# Patient Record
Sex: FEMALE | Race: WHITE | NOT HISPANIC OR LATINO | Employment: OTHER | URBAN - METROPOLITAN AREA
[De-identification: names, ages, dates, MRNs, and addresses within clinical notes are randomized per-mention and may not be internally consistent; named-entity substitution may affect disease eponyms.]

---

## 2017-01-09 ENCOUNTER — HOSPITAL ENCOUNTER (OUTPATIENT)
Dept: RADIOLOGY | Facility: MEDICAL CENTER | Age: 57
End: 2017-01-09
Attending: OBSTETRICS & GYNECOLOGY
Payer: COMMERCIAL

## 2017-01-09 DIAGNOSIS — Z13.9 SCREENING: ICD-10-CM

## 2017-01-09 PROCEDURE — G0202 SCR MAMMO BI INCL CAD: HCPCS

## 2017-01-24 ENCOUNTER — OFFICE VISIT (OUTPATIENT)
Dept: ENDOCRINOLOGY | Facility: MEDICAL CENTER | Age: 57
End: 2017-01-24
Payer: COMMERCIAL

## 2017-01-24 VITALS
SYSTOLIC BLOOD PRESSURE: 130 MMHG | DIASTOLIC BLOOD PRESSURE: 80 MMHG | WEIGHT: 132.4 LBS | BODY MASS INDEX: 22.61 KG/M2 | OXYGEN SATURATION: 97 % | HEIGHT: 64 IN | HEART RATE: 88 BPM

## 2017-01-24 DIAGNOSIS — E04.2 MULTIPLE THYROID NODULES: ICD-10-CM

## 2017-01-24 PROCEDURE — 99213 OFFICE O/P EST LOW 20 MIN: CPT | Performed by: INTERNAL MEDICINE

## 2017-01-24 NOTE — MR AVS SNAPSHOT
"        Sandra Lewis   2017 1:00 PM   Office Visit   MRN: [id number]    Department:  Endocrinology Med [facility]   Dept Phone:  [phone]    Description:  Female : 1960   Provider:  Esdras Almaguer M.D.           Reason for Visit     Follow-Up Thyroid Nodule      Allergies as of 2017     No Known Allergies      You were diagnosed with     Multiple thyroid nodules   [[id number]]         Vital Signs     Blood Pressure Pulse Height Weight Body Mass Index Oxygen Saturation    130/80 mmHg 88 1.626 m (5' 4\") 60.056 kg (132 lb 6.4 oz) 22.72 kg/m2 97%    Last Menstrual Period Smoking Status                2010 Never Smoker           Basic Information     Date Of Birth Sex Race Ethnicity Preferred Language    1960 Female White Non- English      Your appointments     Mar 07, 2017  8:00 AM   [facility] with [address] [facility] ([address])    [address] [facility] [address]  [facility] [id number] [phone]           Check in 30 minutes prior.              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      Below and/or attached are the medications your provider expects you to take. Review all of your home medications and newly ordered medications with your provider and/or pharmacist. Follow medication instructions as directed by your provider and/or pharmacist. Please keep your medication list with you and share with your provider. Update the information when medications are discontinued, doses are changed, or new medications (including over-the-counter products) are added; and carry medication information at all times in the event of emergency situations     Allergies:  No Known Allergies          Medications  Valid as of: January 24, 2017 -  1:24 PM    Generic Name Brand Name Tablet Size Instructions for use    Albuterol Sulfate (Aero Soln) albuterol 108 (90 BASE) MCG/ACT Inhale 2 Puffs by mouth every 6 hours as needed.        Albuterol Sulfate (Aero Soln) albuterol 108 (90 BASE) MCG/ACT Inhale 2 Puffs by mouth every 6 hours as needed for Shortness of Breath.        Aspirin (Tab)  MG Take 2 Tabs by mouth every day.        Atorvastatin Calcium (Tab) LIPITOR 20 MG Take 20 mg by mouth every evening.        B Complex Vitamins   Take  by mouth.        Baclofen (Tab) LIORESAL 10 MG Take 1 Tab by mouth every bedtime.        Cholecalciferol (Cap) vitamin D3 5000 UNITS Take 1 Cap by mouth every day.        Coenzyme Q10 (Cap) coenzyme Q-10 30 MG Take 60 mg by mouth every day.        Cyanocobalamin (Tab) VITAMIN B-12 100 MCG Take 100 mcg by mouth every day.        Estrogens Conjugated   Cream  prn        Fexofenadine HCl (Tab) ALLEGRA 60 MG Take 60 mg by mouth every day.        Fluticasone Propionate (Suspension) FLONASE 50 MCG/ACT Spray 1 Spray in nose 2 times a day.        Krill Oil   Take  by mouth every day.        Lansoprazole (CAPSULE DELAYED RELEASE) PREVACID 30 MG Take 1 Cap by mouth every day.        Omega-3 Fatty Acids (Cap) OMEGA 3 FA 1000 MG  Take 1,000 mg by mouth 3 times a day, with meals.        Turmeric (Cap) Turmeric Curcumin 500 MG Take  by mouth every day.        .                 Medicines prescribed today were sent to:     [facility] [place] [facility] [address] [facility]    [address] [facility] [address]    Phone: [phone] Fax: [phone]    Open 24 Hours?: No      Medication refill instructions:       If your prescription bottle indicates you have medication refills left, it is not necessary to call your provider’s office. Please contact your pharmacy and they will refill your medication.    If your prescription bottle indicates you do not have any refills left, you may request refills at any time through one of the following ways: The online [employer] system (except Urgent Care), by calling your provider’s office, or by asking your pharmacy to contact your provider’s office with a refill request. Medication refills are processed only during regular business hours and may not be available until the next business day. Your provider may request additional information or to have a follow-up visit with you prior to refilling your medication.   *Please Note: Medication refills are assigned a new Rx number when refilled electronically. Your pharmacy may indicate that no refills were authorized even though a new prescription for the same medication is available at the pharmacy. Please request the medicine by name with the pharmacy before contacting your provider for a refill.        Your To Do List     Future Labs/Procedures Complete By Expires    FREE THYROXINE  As directed 1/24/2018    TSH  As directed 1/24/2018    US-SOFT TISSUES OF HEAD - NECK  As directed 1/24/2018         [employer] Access Code: Activation code not generated  Current [employer] Status: Active

## 2017-01-24 NOTE — PROGRESS NOTES
Endocrinology Clinic Progress Note  PCP: Romeo Marquez M.D.    CC: Thyroid nodules    HPI:  Sandra Lewis is a 56 y.o. old patient who comes in today for routine follow up. Patient is new to me today, previously saw Dr. Haynes. Several years ago she was incidentally found to have thyroid nodules on carotid ultrasound. Thyroid ultrasound in 2014 showed bilateral thyroid nodules. 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 She underwent left hemithyroidectomy in April 2015, pathology was benign. No family history of thyroid cancer. No difficulties with swallowing or breathing. She is not on any thyroid medications. Energy levels are good.    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   • H/O hysterectomy for benign disease 10/16/2014   • Hormone replacement therapy, postmenopausal 10/16/2014   • Seasonal allergies 10/16/2014   • Vitamin D insufficiency 10/16/2014   • Vitamin B deficiency 10/16/2014   • Thyroid nodule 10/16/2014   • Hyperlipidemia 06/06/2012       Medications:    Current outpatient prescriptions:   •  aspirin (ASA) 325 MG TABS, Take 2 Tabs by mouth every day., Disp: 100 Tab, Rfl: 3  •  Turmeric Curcumin 500 MG CAPS, Take  by mouth every day., Disp: , Rfl:   •  B Complex Vitamins (B COMPLEX PO), Take  by mouth., Disp: , Rfl:   •  fexofenadine (ALLEGRA) 60 MG TABS, Take 60 mg by mouth every day., Disp: , Rfl:   •  Cholecalciferol (VITAMIN D3) 5000 UNITS CAPS, Take 1 Cap by mouth every day., Disp: , Rfl:   •  cyanocobalamin (VITAMIN B-12) 100 MCG TABS, Take 100 mcg by mouth every day., Disp: , Rfl:   •  coenzyme Q-10 30 MG capsule, Take 60 mg by mouth every day., Disp: , Rfl:   •   docosahexanoic acid (OMEGA 3 FA) 1000 MG CAPS, Take 1,000 mg by mouth 3 times a day, with meals., Disp: , Rfl:   •  lansoprazole (PREVACID) 30 MG CAPSULE DELAYED RELEASE, Take 1 Cap by mouth every day., Disp: 30 Cap, Rfl: 0  •  albuterol 108 (90 BASE) MCG/ACT Aero Soln inhalation aerosol, Inhale 2 Puffs by mouth every 6 hours as needed for Shortness of Breath., Disp: 8.5 g, Rfl: 0  •  fluticasone (FLONASE) 50 MCG/ACT nasal spray, Spray 1 Spray in nose 2 times a day., Disp: 16 g, Rfl: 0  •  baclofen (LIORESAL) 10 MG Tab, Take 1 Tab by mouth every bedtime., Disp: 30 Tab, Rfl: 0  •  albuterol (VENTOLIN OR PROVENTIL) 108 (90 BASE) MCG/ACT AERS inhalation aerosol, Inhale 2 Puffs by mouth every 6 hours as needed., Disp: , Rfl:   •  KRILL OIL PO, Take  by mouth every day., Disp: , Rfl:   •  ESTROGENS CONJUGATED, Cream  prn, Disp: , Rfl:   •  atorvastatin (LIPITOR) 20 MG TABS, Take 20 mg by mouth every evening., Disp: , Rfl:     Labs:    Pathology report from April 2015:  FINAL DIAGNOSIS:  A. Left maddy thyroidectomy:         Left lobe of thyroid demonstrating a benign 0.7 cm follicular          adenoma.  No malignancy identified.    Pathology report from February 2015:  FINAL DIAGNOSIS:  A. Right thyroid fine needle aspiration:         Bloody smears demonstrating scattered small flat groups of          thyroid follicular epithelial cells including a few small          micro-follicles, consistent with a follicular lesion.         No cytologic features of papillary carcinoma are seen.  B. Left thyroid fine needle aspiration:         Bloody smears demonstrating numerous groups of thyroid          follicular epithelial cells showing subtle Hurthle cell change.         There are also scattered groups of follicular epithelial cells          without significant Hurthle cell change and some background          colloid is also noted.         No cytologic features of papillary carcinoma are seen.         See comment.    10/23/2014  "2:45 PM  HISTORY/REASON FOR EXAM:  Thyroid nodule  Mass/Lump.  TECHNIQUE/EXAM DESCRIPTION:  Ultrasound of the soft tissues of the head and neck.  FINDINGS:  The right thyroid lobe measures approximately 3.9 x 0.9 x 1.6 cm in size. The left thyroid lobe measures approximately 4.1 x 0.9 x 1.6 cm in size. The thyroid isthmus measures 1.5 mm.  A hypoechoic nodule in the right medial thyroid lobe measures approximately 11 x 9 x 4 mm. A hypoechoic nodule in the inferior left thyroid lobe measures approximately 10 x 9 x 6 mm. There is internal vascularity and a hypoechoic halo. No microcalcification is identified. There is a smaller cyst in the superior left thyroid lobe.    Physical Examination:  Vital signs: /80 mmHg  Pulse 88  Ht 1.626 m (5' 4\")  Wt 60.056 kg (132 lb 6.4 oz)  BMI 22.72 kg/m2  SpO2 97%  LMP 07/17/2010  General: No apparent distress, cooperative  Eyes: No scleral icterus, no discharge  Neck: No abnormal masses  Resp: Normal effort, clear to auscultation bilaterally  CVS: Regular rate and rhythm, S1 S2 normal, no murmur  Extremities: No lower extremity edema  Psych: Alert and oriented, normal mood and affect    Assessment and Plan:    1. 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    Thank you for allowing me to participate in the care of this patient.    Esdras Almaguer M.D.  01/24/2017    CC:   Romeo Marquez M.D.    This note was created using voice recognition software (Dragon). " The accuracy of the dictation is limited by the abilities of the software. I have reviewed the note prior to signing, however some errors in grammar and context are still possible. If you have any questions related to this note please do not hesitate to contact our office.

## 2017-02-16 ENCOUNTER — APPOINTMENT (OUTPATIENT)
Dept: RADIOLOGY | Facility: MEDICAL CENTER | Age: 57
End: 2017-02-16
Payer: COMMERCIAL

## 2017-03-07 ENCOUNTER — APPOINTMENT (OUTPATIENT)
Dept: RADIOLOGY | Facility: MEDICAL CENTER | Age: 57
End: 2017-03-07
Attending: INTERNAL MEDICINE
Payer: COMMERCIAL

## 2017-04-28 ENCOUNTER — OFFICE VISIT (OUTPATIENT)
Dept: URGENT CARE | Facility: CLINIC | Age: 57
End: 2017-04-28
Payer: COMMERCIAL

## 2017-04-28 VITALS
HEART RATE: 76 BPM | DIASTOLIC BLOOD PRESSURE: 70 MMHG | TEMPERATURE: 98.3 F | OXYGEN SATURATION: 98 % | SYSTOLIC BLOOD PRESSURE: 120 MMHG | RESPIRATION RATE: 16 BRPM

## 2017-04-28 DIAGNOSIS — M25.562 ACUTE PAIN OF LEFT KNEE: ICD-10-CM

## 2017-04-28 DIAGNOSIS — M25.462 KNEE EFFUSION, LEFT: ICD-10-CM

## 2017-04-28 PROCEDURE — 99214 OFFICE O/P EST MOD 30 MIN: CPT | Performed by: NURSE PRACTITIONER

## 2017-04-28 ASSESSMENT — ENCOUNTER SYMPTOMS
JOINT SWELLING: 1
FEVER: 0

## 2017-04-28 NOTE — MR AVS SNAPSHOT
Sandra Tony Lewis   2017 5:00 PM   Office Visit   MRN: [id number]    Department:  [facility]   Dept Phone:  [phone]    Description:  Female : 1960   Provider:  ZHENG Umanzor           Reason for Visit     Knee Injury left knee, need referral to ortho      Allergies as of 2017     No Known Allergies      You were diagnosed with     Acute pain of left knee   [3215410]       Knee effusion, left   [[id number]]         Vital Signs     Blood Pressure Pulse Temperature Respirations Oxygen Saturation Last Menstrual Period    120/70 mmHg 76 36.8 °C (98.3 °F) 16 98% 2010    Smoking Status                   Never Smoker            Basic Information     Date Of Birth Sex Race Ethnicity Preferred Language    1960 Female White Non- English      Problem List              ICD-10-CM Priority Class Noted - Resolved    Hyperlipidemia E78.5   2012 - Present    H/O hysterectomy for benign disease Z90.710   10/16/2014 - Present    Hormone replacement therapy, postmenopausal Z79.890   10/16/2014 - Present    Seasonal allergies J30.2   10/16/2014 - Present    Vitamin D insufficiency E55.9   10/16/2014 - Present    Vitamin B deficiency E53.9   10/16/2014 - Present    Thyroid nodule E04.1   10/16/2014 - Present    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      Below and/or attached are the medications your provider expects you to take. Review all of your home medications and newly ordered medications with your provider and/or  pharmacist. Follow medication instructions as directed by your provider and/or pharmacist. Please keep your medication list with you and share with your provider. Update the information when medications are discontinued, doses are changed, or new medications (including over-the-counter products) are added; and carry medication information at all times in the event of emergency situations     Allergies:  No Known Allergies          Medications  Valid as of: April 28, 2017 -  5:43 PM    Generic Name Brand Name Tablet Size Instructions for use    Albuterol Sulfate (Aero Soln) albuterol 108 (90 BASE) MCG/ACT Inhale 2 Puffs by mouth every 6 hours as needed.        Albuterol Sulfate (Aero Soln) albuterol 108 (90 BASE) MCG/ACT Inhale 2 Puffs by mouth every 6 hours as needed for Shortness of Breath.        Aspirin (Tab)  MG Take 2 Tabs by mouth every day.        Atorvastatin Calcium (Tab) LIPITOR 20 MG Take 20 mg by mouth every evening.        B Complex Vitamins   Take  by mouth.        Baclofen (Tab) LIORESAL 10 MG Take 1 Tab by mouth every bedtime.        Cholecalciferol (Cap) vitamin D3 5000 UNITS Take 1 Cap by mouth every day.        Coenzyme Q10 (Cap) coenzyme Q-10 30 MG Take 60 mg by mouth every day.        Cyanocobalamin (Tab) VITAMIN B-12 100 MCG Take 100 mcg by mouth every day.        Estrogens Conjugated   Cream  prn        Fexofenadine HCl (Tab) ALLEGRA 60 MG Take 60 mg by mouth every day.        Fluticasone Propionate (Suspension) FLONASE 50 MCG/ACT Spray 1 Spray in nose 2 times a day.        Krill Oil   Take  by mouth every day.        Lansoprazole (CAPSULE DELAYED RELEASE) PREVACID 30 MG Take 1 Cap by mouth every day.        Omega-3 Fatty Acids (Cap) OMEGA 3 FA 1000 MG Take 1,000 mg by mouth 3 times a day, with meals.        Turmeric (Cap) Turmeric Curcumin 500 MG Take  by mouth every day.        .                 Medicines prescribed today were sent to:     [id number] - [place] - [address] [facility]     [address] [facility] [address]    Phone: [phone] Fax: [phone]    Open 24 Hours?: No      Medication refill instructions:       If your prescription bottle indicates you have medication refills left, it is not necessary to call your provider’s office. Please contact your pharmacy and they will refill your medication.    If your prescription bottle indicates you do not have any refills left, you may request refills at any time through one of the following ways: The online [employer] system (except Urgent Care), by calling your provider’s office, or by asking your pharmacy to contact your provider’s office with a refill request. Medication refills are processed only during regular business hours and may not be available until the next business day. Your provider may request additional information or to have a follow-up visit with you prior to refilling your medication.   *Please Note: Medication refills are assigned a new Rx number when refilled electronically. Your pharmacy may indicate that no refills were authorized even though a new prescription for the same medication is available at the pharmacy. Please request the medicine by name with the pharmacy before contacting your provider for a refill.        Your To Do List     Future Labs/Procedures Complete By Expires    MR-KNEE-W/O LEFT  As directed 4/28/2018      Referral     A referral request has been sent to our patient care coordination department. Please allow 3-5 business days for us to process this request and contact you either by phone or mail. If you do not hear from us by the 5th business day, please call us at [phone].           [employer] Access Code: Activation code not generated  Current [employer] Status: Active

## 2017-04-29 NOTE — PROGRESS NOTES
Subjective:      Sandra Lewis is a 56 y.o. female who presents with Knee Injury            Knee Injury  This is a new problem. Episode onset: over the last month. The problem occurs constantly. The problem has been unchanged. Associated symptoms include joint swelling. Pertinent negatives include no fever. Associated symptoms comments: She recently has been playing more golf and her knee became significantly more swollen this last week. She also consistently experiences clicking, popping and catching of her left knee . The symptoms are aggravated by standing and twisting. She has tried ice for the symptoms. The treatment provided no relief.       Review of Systems   Constitutional: Negative for fever.   Musculoskeletal: Positive for joint pain (left knee) and joint swelling.   All other systems reviewed and are negative.    Past Medical History   Diagnosis Date   • ASTHMA      no meds   • Other specified symptom associated with female genital organs      lots of cramping, cyst   • Seasonal allergies 10/16/2014   • Vitamin D insufficiency 10/16/2014   • Thyroid disease    • Anesthesia      nausea      Past Surgical History   Procedure Laterality Date   • Septoplasty  2006     Dr. Jackson   • Cystoscopy  8/6/2010     Performed by FABIOLA VAZ at SURGERY SAME DAY [facility]   • Other orthopedic surgery  1987     rt knee arthroscopic   • Pterygium excision  11/17/2010     Performed by STARLA FOWLER at SURGERY SAME DAY [facility]   • Nasal polypectomy  2006     Dr. Craig   • Vaginal hysterectomy scope total  8/6/2010     Performed by FABIOLA VAZ at SURGERY SAME DAY [facility]   • Thyroidectomy  4/16/2015     Performed by John Valente M.D. at SURGERY [facility] DAY [facility]      Social History     Social History   • Marital Status:      Spouse Name: N/A   • Number of Children: N/A   • Years of Education: N/A     Occupational History   • Not on file.     Social History Main Topics   •  Smoking status: Never Smoker    • Smokeless tobacco: Never Used   • Alcohol Use: 3.0 oz/week     3 Glasses of wine, 3 Shots of liquor per week      Comment: 4 per week   • Drug Use: No   • Sexual Activity:     Partners: Male     Other Topics Concern   • Not on file     Social History Narrative          Objective:     /70 mmHg  Pulse 76  Temp(Src) 36.8 °C (98.3 °F)  Resp 16  SpO2 98%  LMP 07/17/2010     Physical Exam   Constitutional: She is oriented to person, place, and time. Vital signs are normal. She appears well-developed and well-nourished.   HENT:   Head: Normocephalic and atraumatic.   Eyes: EOM are normal. Pupils are equal, round, and reactive to light.   Neck: Normal range of motion.   Cardiovascular: Normal rate and regular rhythm.    Pulmonary/Chest: Effort normal.   Musculoskeletal:        Left knee: She exhibits decreased range of motion, swelling, effusion and abnormal meniscus. She exhibits no deformity and normal alignment. No tenderness found.   Mild crepitus appreciated with exam  Moderate STS  No ecchymosis  Distal CMS intact  Antalgic gait   Neurological: She is alert and oriented to person, place, and time. She has normal strength. No cranial nerve deficit or sensory deficit.   Skin: Skin is warm and dry.   Psychiatric: She has a normal mood and affect. Her speech is normal and behavior is normal. Thought content normal.   Vitals reviewed.              Assessment/Plan:     1. Acute pain of left knee  2. Knee effusion, left  - MR-KNEE-W/O LEFT; Future  - REFERRAL TO ORTHOPEDICS    Suspect meniscal tear  RICE  Ibuprofen and Tylenol PRN pain    Supportive care, differential diagnoses, and indications for immediate follow-up discussed with patient.    Pathogenesis of diagnosis discussed including typical length and natural progression.      Instructed to return to  or nearest emergency department if symptoms fail to improve, for any change in condition, further concerns, or new  concerning symptoms.  Patient states understanding of the plan of care and discharge instructions.

## 2017-05-04 ENCOUNTER — APPOINTMENT (OUTPATIENT)
Dept: RADIOLOGY | Facility: MEDICAL CENTER | Age: 57
End: 2017-05-04
Attending: NURSE PRACTITIONER
Payer: COMMERCIAL

## 2017-05-04 DIAGNOSIS — M25.462 KNEE EFFUSION, LEFT: ICD-10-CM

## 2017-05-04 DIAGNOSIS — M25.562 ACUTE PAIN OF LEFT KNEE: ICD-10-CM

## 2017-05-04 PROCEDURE — 73721 MRI JNT OF LWR EXTRE W/O DYE: CPT | Mod: LT

## 2018-05-14 ENCOUNTER — TELEPHONE (OUTPATIENT)
Dept: MEDICAL GROUP | Facility: MEDICAL CENTER | Age: 58
End: 2018-05-14

## 2018-05-14 DIAGNOSIS — Z00.00 ANNUAL PHYSICAL EXAM: ICD-10-CM

## 2018-05-14 NOTE — TELEPHONE ENCOUNTER
1. Caller Name: Pt                                          Call Back Number: [phone] (home)         Patient approves a detailed voicemail message: N\A    2. SPECIFIC Action To Be Taken: Lab orders requested    3. Diagnosis/Clinical Reason for Request: Pt is requesting labs to be ordered before her appt on 5/31/18    4. Specialty & Provider Name/Lab/Imaging Location: None specified    5. Is appointment scheduled for requested order/referral: no    Patient informed they will receive a return phone call from the office ONLY if there are any questions before processing their request. Advised to call back if they haven't received a call from the referral department in 5 days.

## 2018-05-31 ENCOUNTER — OFFICE VISIT (OUTPATIENT)
Dept: MEDICAL GROUP | Facility: MEDICAL CENTER | Age: 58
End: 2018-05-31

## 2018-05-31 VITALS
SYSTOLIC BLOOD PRESSURE: 130 MMHG | BODY MASS INDEX: 22.2 KG/M2 | DIASTOLIC BLOOD PRESSURE: 78 MMHG | RESPIRATION RATE: 14 BRPM | OXYGEN SATURATION: 99 % | HEIGHT: 64 IN | TEMPERATURE: 97.3 F | WEIGHT: 130 LBS | HEART RATE: 76 BPM

## 2018-05-31 DIAGNOSIS — Z00.00 ANNUAL PHYSICAL EXAM: ICD-10-CM

## 2018-05-31 DIAGNOSIS — M25.561 CHRONIC PAIN OF RIGHT KNEE: ICD-10-CM

## 2018-05-31 DIAGNOSIS — G89.29 CHRONIC PAIN OF RIGHT KNEE: ICD-10-CM

## 2018-05-31 DIAGNOSIS — N95.2 POSTMENOPAUSAL ATROPHIC VAGINITIS: ICD-10-CM

## 2018-05-31 DIAGNOSIS — Z12.83 SKIN CANCER SCREENING: ICD-10-CM

## 2018-05-31 DIAGNOSIS — E78.00 PURE HYPERCHOLESTEROLEMIA: ICD-10-CM

## 2018-05-31 DIAGNOSIS — Z12.31 ENCOUNTER FOR SCREENING MAMMOGRAM FOR BREAST CANCER: ICD-10-CM

## 2018-05-31 DIAGNOSIS — Z78.0 ASYMPTOMATIC MENOPAUSAL STATE: ICD-10-CM

## 2018-05-31 DIAGNOSIS — R92.30 DENSE BREAST TISSUE: ICD-10-CM

## 2018-05-31 DIAGNOSIS — E04.1 THYROID NODULE: ICD-10-CM

## 2018-05-31 PROCEDURE — 99396 PREV VISIT EST AGE 40-64: CPT | Performed by: FAMILY MEDICINE

## 2018-05-31 RX ORDER — ESTRADIOL 0.1 MG/G
1 CREAM VAGINAL DAILY
Qty: 42.5 G | Refills: 2 | Status: SHIPPED | OUTPATIENT
Start: 2018-05-31 | End: 2021-03-18

## 2018-05-31 ASSESSMENT — PATIENT HEALTH QUESTIONNAIRE - PHQ9: CLINICAL INTERPRETATION OF PHQ2 SCORE: 0

## 2018-05-31 NOTE — PROGRESS NOTES
Subjective:   Snadra Lewis is a 58 y.o. female here today for Annual    Patient is exercising by walking when she plays golf.  She plays golf regularly.  She has been having chronic on and off right knee pain which has been told she has severe osteoarthritis in her knees.  She is requesting referral to physical therapy for her right knee.  Patient is also requesting a breast ultrasound for dense breasts, last mammogram showed heterogeneously dense breasts.  She is also requesting a bone density and is interested in having a CT cardiac scoring to follow up on her LAD plaque 6 years ago.    Current medicines (including changes today)  Current Outpatient Prescriptions   Medication Sig Dispense Refill   • estradiol (ESTRACE) 0.1 MG/GM vaginal cream Insert 1 g in vagina every day. 42.5 g 2   • albuterol 108 (90 BASE) MCG/ACT Aero Soln inhalation aerosol Inhale 2 Puffs by mouth every 6 hours as needed for Shortness of Breath. 8.5 g 0   • fluticasone (FLONASE) 50 MCG/ACT nasal spray Spray 1 Spray in nose 2 times a day. 16 g 0   • aspirin (ASA) 325 MG TABS Take 2 Tabs by mouth every day. 100 Tab 3   • albuterol (VENTOLIN OR PROVENTIL) 108 (90 BASE) MCG/ACT AERS inhalation aerosol Inhale 2 Puffs by mouth every 6 hours as needed.     • Turmeric Curcumin 500 MG CAPS Take  by mouth every day.     • B Complex Vitamins (B COMPLEX PO) Take  by mouth.     • KRILL OIL PO Take  by mouth every day.     • fexofenadine (ALLEGRA) 60 MG TABS Take 60 mg by mouth every day.     • Cholecalciferol (VITAMIN D3) 5000 UNITS CAPS Take 1 Cap by mouth every day.     • cyanocobalamin (VITAMIN B-12) 100 MCG TABS Take 100 mcg by mouth every day.     • coenzyme Q-10 30 MG capsule Take 60 mg by mouth every day.     • docosahexanoic acid (OMEGA 3 FA) 1000 MG CAPS Take 1,000 mg by mouth 3 times a day, with meals.       No current facility-administered medications for this visit.      She  has a past medical history of Anesthesia; ASTHMA; Other  "specified symptom associated with female genital organs; Seasonal allergies (10/16/2014); Thyroid disease; and Vitamin D insufficiency (10/16/2014). She also has no past medical history of Breast cancer (HCC).    ROS   No chest pain, no shortness of breath, no abdominal pain       Objective:     Blood pressure 130/78, pulse 76, temperature 36.3 °C (97.3 °F), resp. rate 14, height 1.626 m (5' 4\"), weight 59 kg (130 lb), last menstrual period 07/29/2010, SpO2 99 %. Body mass index is 22.31 kg/m².   Physical Exam:  Constitutional: Alert, no distress.  Skin: Warm, dry, good turgor, no rashes in visible areas.  Eye: Equal, round and reactive, conjunctiva clear, lids normal.  ENMT: Lips without lesions, good dentition, oropharynx clear.  TMs pearly gray bilaterally  Neck: Trachea midline, no masses, no thyromegaly. No cervical or supraclavicular lymphadenopathy  Respiratory: Unlabored respiratory effort, lungs clear to auscultation, no wheezes, no ronchi.  Cardiovascular: Normal S1, S2, no murmur, no edema.  Psych: Alert and oriented x3, normal affect and mood.        Assessment and Plan:   The following treatment plan was discussed    1. Annual physical exam  Advised healthy lifestyle.    2. Chronic pain of right knee  Referral to physical therapy per patient's request.  - REFERRAL TO PHYSICAL THERAPY Reason for Therapy: Eval/Treat/Report    3. Pure hypercholesterolemia  Check labs and CT scan to evaluate for cardiac risk.  Patient has a family history of heart disease with her mother.  She may need to be on Crestor and aspirin  - LIPID PROFILE; Future  - TSH WITH REFLEX TO FT4; Future  - COMP METABOLIC PANEL; Future  - CT-CARDIAC SCORING; Future    4. Dense breast tissue  Ultrasound ordered, call with results    5. Asymptomatic menopausal state  DEXA ordered, call with results.  - DS-BONE DENSITY STUDY (DEXA); Future    6. Encounter for screening mammogram for breast cancer  - US-SCREENING WHOLE BREAST BILATERAL (3D " SCREENING); Future    7. Thyroid nodule  Removed surgically.    8. Postmenopausal atrophic vaginitis  Prescription for estradiol.  Patient has follow-up with GYN in about 2 months.  - estradiol (ESTRACE) 0.1 MG/GM vaginal cream; Insert 1 g in vagina every day.  Dispense: 42.5 g; Refill: 2    9. Skin cancer screening  - REFERRAL TO DERMATOLOGY      Followup: Return in about 1 year (around 5/31/2019) for Annual.

## 2019-05-31 ENCOUNTER — OFFICE VISIT (OUTPATIENT)
Dept: MEDICAL GROUP | Facility: MEDICAL CENTER | Age: 59
End: 2019-05-31
Payer: COMMERCIAL

## 2019-05-31 VITALS
TEMPERATURE: 98.4 F | BODY MASS INDEX: 20.49 KG/M2 | DIASTOLIC BLOOD PRESSURE: 74 MMHG | WEIGHT: 120 LBS | OXYGEN SATURATION: 96 % | SYSTOLIC BLOOD PRESSURE: 120 MMHG | HEIGHT: 64 IN | HEART RATE: 80 BPM

## 2019-05-31 DIAGNOSIS — E04.1 THYROID NODULE: ICD-10-CM

## 2019-05-31 DIAGNOSIS — Z00.00 ANNUAL PHYSICAL EXAM: ICD-10-CM

## 2019-05-31 DIAGNOSIS — Z12.83 SKIN CANCER SCREENING: ICD-10-CM

## 2019-05-31 DIAGNOSIS — E53.8 VITAMIN B12 DEFICIENCY: ICD-10-CM

## 2019-05-31 DIAGNOSIS — E78.00 PURE HYPERCHOLESTEROLEMIA: ICD-10-CM

## 2019-05-31 DIAGNOSIS — M25.562 CHRONIC PAIN OF LEFT KNEE: ICD-10-CM

## 2019-05-31 DIAGNOSIS — Z78.0 ASYMPTOMATIC MENOPAUSAL STATE: ICD-10-CM

## 2019-05-31 DIAGNOSIS — Z11.59 NEED FOR HEPATITIS C SCREENING TEST: ICD-10-CM

## 2019-05-31 DIAGNOSIS — R92.30 DENSE BREAST TISSUE ON MAMMOGRAM: ICD-10-CM

## 2019-05-31 DIAGNOSIS — Z12.11 COLON CANCER SCREENING: ICD-10-CM

## 2019-05-31 DIAGNOSIS — G89.29 CHRONIC PAIN OF LEFT KNEE: ICD-10-CM

## 2019-05-31 PROCEDURE — 99396 PREV VISIT EST AGE 40-64: CPT | Performed by: FAMILY MEDICINE

## 2019-05-31 ASSESSMENT — PATIENT HEALTH QUESTIONNAIRE - PHQ9: CLINICAL INTERPRETATION OF PHQ2 SCORE: 0

## 2019-05-31 NOTE — ASSESSMENT & PLAN NOTE
Has known thyroid nodules that has not been followed up with several years.  She had surgery possibly on her left thyroid nodules for resection.

## 2019-05-31 NOTE — ASSESSMENT & PLAN NOTE
Patient is having chronic and worsening knee pain, popping sensation.  Her knee pain is actually improved with her doing strength building exercises.    MRI 2017:  1. No meniscal or ligamentous injury.  2. Isolated severe osteoarthritis of the patellofemoral compartment, especially along the lateral aspect.  3. Findings in keeping with patellar tendon-lateral femoral condyle friction syndrome.

## 2019-05-31 NOTE — ASSESSMENT & PLAN NOTE
Patient has known familial hyperlipidemia.  Her last coronary calcium score was 2012 and she had a calcium score of 72 in her LAD, 0 in all other coronary arteries.    She discontinued atorvastatin about 2 years ago.    Her mother required CABG, endarterectomy of carotid arteries bilaterally.

## 2019-05-31 NOTE — PROGRESS NOTES
Subjective:   Sandra Lewis is a 59 y.o. female here today for annual    Chronic pain of left knee  Patient is having chronic and worsening knee pain, popping sensation.  Her knee pain is actually improved with her doing strength building exercises.    MRI 2017:  1. No meniscal or ligamentous injury.  2. Isolated severe osteoarthritis of the patellofemoral compartment, especially along the lateral aspect.  3. Findings in keeping with patellar tendon-lateral femoral condyle friction syndrome.    Thyroid nodule  Has known thyroid nodules that has not been followed up with several years.  She had surgery possibly on her left thyroid nodules for resection.    Hyperlipidemia  Patient has known familial hyperlipidemia.  Her last coronary calcium score was 2012 and she had a calcium score of 72 in her LAD, 0 in all other coronary arteries.    She discontinued atorvastatin about 2 years ago.    Her mother required CABG, endarterectomy of carotid arteries bilaterally.         Current medicines (including changes today)  Current Outpatient Prescriptions   Medication Sig Dispense Refill   • estradiol (ESTRACE) 0.1 MG/GM vaginal cream Insert 1 g in vagina every day. 42.5 g 2   • albuterol 108 (90 BASE) MCG/ACT Aero Soln inhalation aerosol Inhale 2 Puffs by mouth every 6 hours as needed for Shortness of Breath. 8.5 g 0   • fluticasone (FLONASE) 50 MCG/ACT nasal spray Spray 1 Spray in nose 2 times a day. 16 g 0   • aspirin (ASA) 325 MG TABS Take 2 Tabs by mouth every day. 100 Tab 3   • albuterol (VENTOLIN OR PROVENTIL) 108 (90 BASE) MCG/ACT AERS inhalation aerosol Inhale 2 Puffs by mouth every 6 hours as needed.     • Turmeric Curcumin 500 MG CAPS Take  by mouth every day.     • B Complex Vitamins (B COMPLEX PO) Take  by mouth.     • KRILL OIL PO Take  by mouth every day.     • fexofenadine (ALLEGRA) 60 MG TABS Take 60 mg by mouth every day.     • Cholecalciferol (VITAMIN D3) 5000 UNITS CAPS Take 1 Cap by mouth every day.  "    • cyanocobalamin (VITAMIN B-12) 100 MCG TABS Take 100 mcg by mouth every day.     • coenzyme Q-10 30 MG capsule Take 60 mg by mouth every day.     • docosahexanoic acid (OMEGA 3 FA) 1000 MG CAPS Take 1,000 mg by mouth 3 times a day, with meals.       No current facility-administered medications for this visit.      She  has a past medical history of Anesthesia; ASTHMA; Other specified symptom associated with female genital organs; Seasonal allergies (10/16/2014); Thyroid disease; and Vitamin D insufficiency (10/16/2014). She also has no past medical history of Breast cancer (HCC).    ROS   Occasional chest pain, no shortness of breath, no abdominal pain       Objective:     /74 (BP Location: Right arm, Patient Position: Sitting)   Pulse 80   Temp 36.9 °C (98.4 °F)   Ht 1.626 m (5' 4\")   Wt 54.4 kg (120 lb)   SpO2 96%  Body mass index is 20.6 kg/m².   Physical Exam:  Constitutional: Alert, no distress.  Skin: Warm, dry, good turgor, no rashes in visible areas.  Eye: Equal, round and reactive, conjunctiva clear, lids normal.  ENMT: Lips without lesions, good dentition, oropharynx clear.  TMs pearly gray bilaterally  Neck: Trachea midline, no masses, no thyromegaly. No cervical or supraclavicular lymphadenopathy  Respiratory: Unlabored respiratory effort, lungs clear to auscultation, no wheezes, no ronchi.  Cardiovascular: Normal S1, S2, no murmur, no edema.  Psych: Alert and oriented x3, normal affect and mood.        Assessment and Plan:   The following treatment plan was discussed    1. Annual physical exam  Advised healthy lifestyle.    2. Pure hypercholesterolemia  Check labs and coronary calcium score.  Call with results.  Patient may need to be started back on statin, possibly rosuvastatin.  - Comp Metabolic Panel; Future  - Lipid Profile; Future  - TSH WITH REFLEX TO FT4; Future  - CT-CARDIAC SCORING; Future    3. Asymptomatic menopausal state  Check DEXA and call with results.  - DS-BONE DENSITY " STUDY (DEXA); Future    4. Chronic pain of left knee  Most likely related to known severe degeneration.  Continue to monitor.  Continue strength building exercises.    5. Vitamin B12 deficiency  Check labs and call with results.  Continue vitamin B complex.  - VITAMIN B12; Future    6. Dense breast tissue on mammogram  Check ultrasound and call with results.  - US-SCREENING WHOLE BREAST BILATERAL (3D SCREENING); Future    7. Need for hepatitis C screening test  - HEP C VIRUS ANTIBODY; Future    8. Colon cancer screening  - REFERRAL TO GASTROENTEROLOGY    9. Thyroid nodule  - US-SOFT TISSUES OF HEAD - NECK; Future    10. Skin cancer screening  - REFERRAL TO DERMATOLOGY      Followup: Return in about 1 year (around 5/31/2020) for Annual.

## 2019-06-17 ENCOUNTER — HOSPITAL ENCOUNTER (OUTPATIENT)
Dept: RADIOLOGY | Facility: MEDICAL CENTER | Age: 59
End: 2019-06-17
Attending: FAMILY MEDICINE
Payer: COMMERCIAL

## 2019-06-17 ENCOUNTER — TELEPHONE (OUTPATIENT)
Dept: MEDICAL GROUP | Facility: MEDICAL CENTER | Age: 59
End: 2019-06-17

## 2019-06-17 DIAGNOSIS — E78.00 PURE HYPERCHOLESTEROLEMIA: ICD-10-CM

## 2019-06-17 DIAGNOSIS — E04.1 THYROID NODULE: ICD-10-CM

## 2019-06-17 DIAGNOSIS — Z78.0 ASYMPTOMATIC MENOPAUSAL STATE: ICD-10-CM

## 2019-06-17 PROCEDURE — 4410556 CT-CARDIAC SCORING

## 2019-06-17 PROCEDURE — 76536 US EXAM OF HEAD AND NECK: CPT

## 2019-06-17 PROCEDURE — 77080 DXA BONE DENSITY AXIAL: CPT

## 2019-06-17 NOTE — TELEPHONE ENCOUNTER
----- Message from Romeo Marquez M.D. sent at 6/17/2019  3:35 PM PDT -----  Please notify patient that she has osteopenia, which is a slight weakening of the bones. We recommend calcium 1200 mg daily with food and vitamin D 2000 units daily with food. We also recommend regular weightbearing exercise such as walking daily.  Bone density should be rechecked in 2-5 years.  Romeo Marquez MD

## 2019-06-17 NOTE — TELEPHONE ENCOUNTER
----- Message from Romeo Marquez M.D. sent at 6/17/2019  3:53 PM PDT -----  Please have patient schedule appointment to discuss recent CT scan results. Not urgent or critical.  Please make sure she has her labs done before our appointment as well.  Romeo Marquez MD

## 2019-06-17 NOTE — TELEPHONE ENCOUNTER
----- Message from Romeo Marquez M.D. sent at 6/17/2019  3:35 PM PDT -----  Please notify patient that her right thyroid nodule is unchanged in size and appearance.  The radiologist recommends keeping track of this nodule annually for a few years.  Romeo Marquez M.D.

## 2019-06-18 ENCOUNTER — PATIENT MESSAGE (OUTPATIENT)
Dept: MEDICAL GROUP | Facility: MEDICAL CENTER | Age: 59
End: 2019-06-18

## 2019-06-18 NOTE — TELEPHONE ENCOUNTER
From: Sandra Lewis  To: Romeo Marquez M.D.  Sent: 6/18/2019 7:21 AM PDT  Subject: Test Result Question    Good morning Jose Elias Marroquin...got the results of the Heart Ct Scan and Bone Density.     Heart: I am seriously modifying my diet and want to reverse or stall any further plaque build up. What are your thoughts on the [place] Diet? Lowering fat and cholesterol intake and eating more of a vegan diet.   Should I be on a statin?  Also, what do you recommend for the bone density.    Thanks in advance for any recommendations.    Kind Regards,    Sandra

## 2019-06-27 ENCOUNTER — HOSPITAL ENCOUNTER (OUTPATIENT)
Dept: LAB | Facility: MEDICAL CENTER | Age: 59
End: 2019-06-27
Attending: FAMILY MEDICINE
Payer: COMMERCIAL

## 2019-06-27 DIAGNOSIS — E78.00 PURE HYPERCHOLESTEROLEMIA: ICD-10-CM

## 2019-06-27 DIAGNOSIS — E53.8 VITAMIN B12 DEFICIENCY: ICD-10-CM

## 2019-06-27 DIAGNOSIS — Z11.59 NEED FOR HEPATITIS C SCREENING TEST: ICD-10-CM

## 2019-06-27 LAB
ALBUMIN SERPL BCP-MCNC: 4.3 G/DL (ref 3.2–4.9)
ALBUMIN/GLOB SERPL: 1.7 G/DL
ALP SERPL-CCNC: 76 U/L (ref 30–99)
ALT SERPL-CCNC: 19 U/L (ref 2–50)
ANION GAP SERPL CALC-SCNC: 10 MMOL/L (ref 0–11.9)
AST SERPL-CCNC: 25 U/L (ref 12–45)
BILIRUB SERPL-MCNC: 0.6 MG/DL (ref 0.1–1.5)
BUN SERPL-MCNC: 14 MG/DL (ref 8–22)
CALCIUM SERPL-MCNC: 9.5 MG/DL (ref 8.5–10.5)
CHLORIDE SERPL-SCNC: 100 MMOL/L (ref 96–112)
CHOLEST SERPL-MCNC: 302 MG/DL (ref 100–199)
CO2 SERPL-SCNC: 27 MMOL/L (ref 20–33)
CREAT SERPL-MCNC: 0.71 MG/DL (ref 0.5–1.4)
FASTING STATUS PATIENT QL REPORTED: NORMAL
GLOBULIN SER CALC-MCNC: 2.6 G/DL (ref 1.9–3.5)
GLUCOSE SERPL-MCNC: 77 MG/DL (ref 65–99)
HCV AB SER QL: NEGATIVE
HDLC SERPL-MCNC: 90 MG/DL
LDLC SERPL CALC-MCNC: 195 MG/DL
POTASSIUM SERPL-SCNC: 4.3 MMOL/L (ref 3.6–5.5)
PROT SERPL-MCNC: 6.9 G/DL (ref 6–8.2)
SODIUM SERPL-SCNC: 137 MMOL/L (ref 135–145)
TRIGL SERPL-MCNC: 87 MG/DL (ref 0–149)
TSH SERPL DL<=0.005 MIU/L-ACNC: 2.23 UIU/ML (ref 0.38–5.33)
VIT B12 SERPL-MCNC: 1132 PG/ML (ref 211–911)

## 2019-06-27 PROCEDURE — 80061 LIPID PANEL: CPT

## 2019-06-27 PROCEDURE — 86803 HEPATITIS C AB TEST: CPT

## 2019-06-27 PROCEDURE — 36415 COLL VENOUS BLD VENIPUNCTURE: CPT

## 2019-06-27 PROCEDURE — 80053 COMPREHEN METABOLIC PANEL: CPT

## 2019-06-27 PROCEDURE — 84443 ASSAY THYROID STIM HORMONE: CPT

## 2019-06-27 PROCEDURE — 82607 VITAMIN B-12: CPT

## 2019-07-01 ENCOUNTER — OFFICE VISIT (OUTPATIENT)
Dept: MEDICAL GROUP | Facility: MEDICAL CENTER | Age: 59
End: 2019-07-01
Payer: COMMERCIAL

## 2019-07-01 VITALS
BODY MASS INDEX: 20.49 KG/M2 | TEMPERATURE: 98.2 F | HEIGHT: 64 IN | SYSTOLIC BLOOD PRESSURE: 124 MMHG | WEIGHT: 120 LBS | HEART RATE: 65 BPM | DIASTOLIC BLOOD PRESSURE: 76 MMHG | OXYGEN SATURATION: 100 %

## 2019-07-01 DIAGNOSIS — E78.49 FAMILIAL HYPERLIPIDEMIA: ICD-10-CM

## 2019-07-01 DIAGNOSIS — M85.89 OSTEOPENIA OF MULTIPLE SITES: ICD-10-CM

## 2019-07-01 PROCEDURE — 99214 OFFICE O/P EST MOD 30 MIN: CPT | Performed by: FAMILY MEDICINE

## 2019-07-01 RX ORDER — ROSUVASTATIN CALCIUM 10 MG/1
10 TABLET, COATED ORAL EVERY EVENING
Qty: 90 TAB | Refills: 3 | Status: SHIPPED | OUTPATIENT
Start: 2019-07-01 | End: 2020-07-22

## 2019-07-01 NOTE — ASSESSMENT & PLAN NOTE
Patient states that her mother has had bilateral carotid endarterectomies and triple bypass.  Her mother has a similar cholesterol profile as her.  Patient had a CT coronary calcium score which showed:  Coronary calcification:  LMA - 0.0  LCX 22.9  .6  RCA 28.6  PDA - 0.0  Total Calcium Score: 220.1    Reviewed labs with patient.  Results for URIEL YOUNGBLOOD (MRN [id number]) as of 7/1/2019 16:22   Ref. Range 6/27/2019 10:25   Cholesterol,Tot Latest Ref Range: 100 - 199 mg/dL 302 (H)   Triglycerides Latest Ref Range: 0 - 149 mg/dL 87   HDL Latest Ref Range: >=40 mg/dL 90   LDL Latest Ref Range: <100 mg/dL 195 (H)     Took Lipitor in the past, but discontinued because of some muscle pain.

## 2019-07-01 NOTE — PATIENT INSTRUCTIONS
For adults, the American Heart Association recommends:    Consume a dietary pattern that emphasizes intake of vegetables, fruits, and whole grains; includes low-fat dairy products, poultry, fish, legumes, nontropical vegetable oils and nuts; and limits intake of sweets, sugar-sweetened beverages and red meats.  Look up DASH diet for more information.    Aim for a dietary pattern that achieves 5% to 6% of calories from saturated fat.     Reduce/eliminate percent of calories from trans fat     Consume no more than 2,400 mg of sodium/day     Engage in aerobic physical activity, 3 to 4 sessions a week, lasting on average 40 minutes per session, and involving moderate-to-vigorous intensity physical activity.

## 2019-07-01 NOTE — PROGRESS NOTES
Subjective:   Sandra Youngblood is a 59 y.o. female here today for hyperlipidemia    Familial hyperlipidemia  Patient states that her mother has had bilateral carotid endarterectomies and triple bypass.  Her mother has a similar cholesterol profile as her.  Patient had a CT coronary calcium score which showed:  Coronary calcification:  LMA - 0.0  LCX 22.9  .6  RCA 28.6  PDA - 0.0  Total Calcium Score: 220.1    Reviewed labs with patient.  Results for SANDRA YOUNGBLOOD (MRN [id number]) as of 7/1/2019 16:22   Ref. Range 6/27/2019 10:25   Cholesterol,Tot Latest Ref Range: 100 - 199 mg/dL 302 (H)   Triglycerides Latest Ref Range: 0 - 149 mg/dL 87   HDL Latest Ref Range: >=40 mg/dL 90   LDL Latest Ref Range: <100 mg/dL 195 (H)     Took Lipitor in the past, but discontinued because of some muscle pain.    Osteopenia of multiple sites  Reviewed patient's DEXA results:  10-year Probability of Fracture:  Major Osteoporotic     8.8%  Hip     1.1%         Current medicines (including changes today)  Current Outpatient Prescriptions   Medication Sig Dispense Refill   • rosuvastatin (CRESTOR) 10 MG Tab Take 1 Tab by mouth every evening. 90 Tab 3   • estradiol (ESTRACE) 0.1 MG/GM vaginal cream Insert 1 g in vagina every day. 42.5 g 2   • albuterol 108 (90 BASE) MCG/ACT Aero Soln inhalation aerosol Inhale 2 Puffs by mouth every 6 hours as needed for Shortness of Breath. 8.5 g 0   • fluticasone (FLONASE) 50 MCG/ACT nasal spray Spray 1 Spray in nose 2 times a day. 16 g 0   • aspirin (ASA) 325 MG TABS Take 2 Tabs by mouth every day. 100 Tab 3   • albuterol (VENTOLIN OR PROVENTIL) 108 (90 BASE) MCG/ACT AERS inhalation aerosol Inhale 2 Puffs by mouth every 6 hours as needed.     • Turmeric Curcumin 500 MG CAPS Take  by mouth every day.     • B Complex Vitamins (B COMPLEX PO) Take  by mouth.     • KRILL OIL PO Take  by mouth every day.     • fexofenadine (ALLEGRA) 60 MG TABS Take 60 mg by mouth every day.     •  "Cholecalciferol (VITAMIN D3) 5000 UNITS CAPS Take 1 Cap by mouth every day.     • cyanocobalamin (VITAMIN B-12) 100 MCG TABS Take 100 mcg by mouth every day.     • coenzyme Q-10 30 MG capsule Take 60 mg by mouth every day.     • docosahexanoic acid (OMEGA 3 FA) 1000 MG CAPS Take 1,000 mg by mouth 3 times a day, with meals.       No current facility-administered medications for this visit.      She  has a past medical history of Anesthesia; ASTHMA; Other specified symptom associated with female genital organs; Seasonal allergies (10/16/2014); Thyroid disease; and Vitamin D insufficiency (10/16/2014). She also has no past medical history of Breast cancer (HCC).    ROS   No chest pain       Objective:     /76 (BP Location: Right arm, Patient Position: Sitting)   Pulse 65   Temp 36.8 °C (98.2 °F)   Ht 1.626 m (5' 4\")   Wt 54.4 kg (120 lb)   SpO2 100%  Body mass index is 20.6 kg/m².   Physical Exam:  Constitutional: Alert, no distress.  Skin: Warm, dry, good turgor, no rashes in visible areas.  Eye: Equal, round and reactive, conjunctiva clear, lids normal.  Psych: Alert and oriented x3, normal affect and mood.        Assessment and Plan:   The following treatment plan was discussed    1. Familial hyperlipidemia  New problem.  Prescription for Crestor 10 mg daily.  Recheck labs in 3 months.  If LDL is not less than 100 consider increasing Crestor to 20 mg and rechecking labs in another 3 months.  Advised diet and exercise.  - rosuvastatin (CRESTOR) 10 MG Tab; Take 1 Tab by mouth every evening.  Dispense: 90 Tab; Refill: 3  - Lipid Profile; Future  - Comp Metabolic Panel; Future    2. Osteopenia of multiple sites  Advised weightbearing exercise.      Followup: Return in about 1 year (around 7/1/2020) for Annual.         "

## 2019-07-01 NOTE — ASSESSMENT & PLAN NOTE
Reviewed patient's DEXA results:  10-year Probability of Fracture:  Major Osteoporotic     8.8%  Hip     1.1%

## 2019-07-17 ENCOUNTER — HOSPITAL ENCOUNTER (OUTPATIENT)
Dept: RADIOLOGY | Facility: MEDICAL CENTER | Age: 59
End: 2019-07-17
Attending: FAMILY MEDICINE
Payer: COMMERCIAL

## 2019-09-09 DIAGNOSIS — J45.901: ICD-10-CM

## 2019-09-09 RX ORDER — ALBUTEROL SULFATE 90 UG/1
2 AEROSOL, METERED RESPIRATORY (INHALATION) EVERY 6 HOURS PRN
Qty: 8.5 G | Refills: 11 | Status: SHIPPED | OUTPATIENT
Start: 2019-09-09 | End: 2020-10-23

## 2019-09-09 RX ORDER — ALBUTEROL SULFATE 90 UG/1
2 AEROSOL, METERED RESPIRATORY (INHALATION) EVERY 6 HOURS PRN
Qty: 8.5 G | Refills: 11 | Status: SHIPPED | OUTPATIENT
Start: 2019-09-09 | End: 2022-04-13

## 2019-10-08 ENCOUNTER — HOSPITAL ENCOUNTER (OUTPATIENT)
Dept: RADIOLOGY | Facility: MEDICAL CENTER | Age: 59
End: 2019-10-08
Attending: FAMILY MEDICINE
Payer: COMMERCIAL

## 2019-10-08 DIAGNOSIS — Z12.31 VISIT FOR SCREENING MAMMOGRAM: ICD-10-CM

## 2019-10-08 PROCEDURE — 77063 BREAST TOMOSYNTHESIS BI: CPT

## 2019-10-10 ENCOUNTER — PATIENT MESSAGE (OUTPATIENT)
Dept: MEDICAL GROUP | Facility: MEDICAL CENTER | Age: 59
End: 2019-10-10

## 2019-10-10 DIAGNOSIS — Z01.00 EYE EXAM, ROUTINE: ICD-10-CM

## 2019-10-11 ENCOUNTER — TELEPHONE (OUTPATIENT)
Dept: MEDICAL GROUP | Facility: MEDICAL CENTER | Age: 59
End: 2019-10-11

## 2019-10-11 DIAGNOSIS — R92.30 DENSE BREAST TISSUE ON MAMMOGRAM: ICD-10-CM

## 2019-10-11 NOTE — TELEPHONE ENCOUNTER
----- Message from Romeo Marquez M.D. sent at 10/9/2019  8:07 AM PDT -----  Please notify the patient that her mammogram shows that she has high breast density. This can lower the accuracy of mammograms. There is a screening ultrasound that can help to detect breast cancers in women with high breast density.  The screening ultrasound is not covered with insurance and cost around $125.  If she would like for me to order the screening ultrasound, please let me know.  Romeo Marquez M.D.

## 2020-06-10 ENCOUNTER — PATIENT MESSAGE (OUTPATIENT)
Dept: MEDICAL GROUP | Facility: MEDICAL CENTER | Age: 60
End: 2020-06-10

## 2020-06-10 DIAGNOSIS — Z01.00 EYE EXAM, ROUTINE: ICD-10-CM

## 2020-08-07 ENCOUNTER — PATIENT MESSAGE (OUTPATIENT)
Dept: MEDICAL GROUP | Facility: MEDICAL CENTER | Age: 60
End: 2020-08-07

## 2020-08-07 RX ORDER — ACYCLOVIR 400 MG/1
400 TABLET ORAL 3 TIMES DAILY
Qty: 21 TAB | Refills: 1 | Status: SHIPPED | OUTPATIENT
Start: 2020-08-07 | End: 2020-08-14

## 2020-08-07 NOTE — TELEPHONE ENCOUNTER
From: Sandar Lewis  To: Romeo Marquez M.D.  Sent: 8/7/2020 6:51 AM PDT  Subject: Prescription Question    Good Morning Dr. Mraquez,  I get fever blisters from dryness and the sun. I used to have a scrip for Valtrex for the occasional flare up. Can you call in a scrip for me or do I need to be seen at Urgent Care? I've got a blister just beginning.   Thank you  Sandra

## 2020-08-14 ENCOUNTER — PATIENT MESSAGE (OUTPATIENT)
Dept: MEDICAL GROUP | Facility: MEDICAL CENTER | Age: 60
End: 2020-08-14

## 2020-08-14 RX ORDER — SULFAMETHOXAZOLE AND TRIMETHOPRIM 800; 160 MG/1; MG/1
1 TABLET ORAL 2 TIMES DAILY
Qty: 10 TAB | Refills: 0 | Status: SHIPPED | OUTPATIENT
Start: 2020-08-14 | End: 2020-08-19

## 2020-09-17 ENCOUNTER — PATIENT MESSAGE (OUTPATIENT)
Dept: MEDICAL GROUP | Facility: MEDICAL CENTER | Age: 60
End: 2020-09-17

## 2020-09-17 DIAGNOSIS — Z11.59 ENCOUNTER FOR SCREENING FOR OTHER VIRAL DISEASES: ICD-10-CM

## 2020-09-17 NOTE — TELEPHONE ENCOUNTER
From: Sandra Lewis  To: Romeo Marquez M.D.  Sent: 9/17/2020 2:52 PM PDT  Subject: Non-Urgent Medical Question    Hi Dr Marquez,  My  was just notified that he was in the presence of someone who tested positive for Covid. Can you please give me an order to be tested through the drive up area behind the hospital?   Neither of us has any symptoms.  I was told I need the order in hand for the test. Can it be attached or can you email it to me at   [email or web address]    Thanks  Sandra

## 2020-09-18 ENCOUNTER — HOSPITAL ENCOUNTER (OUTPATIENT)
Dept: LAB | Facility: MEDICAL CENTER | Age: 60
End: 2020-09-18
Attending: FAMILY MEDICINE
Payer: COMMERCIAL

## 2020-09-18 DIAGNOSIS — Z11.59 ENCOUNTER FOR SCREENING FOR OTHER VIRAL DISEASES: ICD-10-CM

## 2020-09-18 LAB
COVID ORDER STATUS COVID19: NORMAL
SARS-COV-2 RNA RESP QL NAA+PROBE: NOTDETECTED
SPECIMEN SOURCE: NORMAL

## 2020-09-18 PROCEDURE — C9803 HOPD COVID-19 SPEC COLLECT: HCPCS

## 2020-09-18 PROCEDURE — U0003 INFECTIOUS AGENT DETECTION BY NUCLEIC ACID (DNA OR RNA); SEVERE ACUTE RESPIRATORY SYNDROME CORONAVIRUS 2 (SARS-COV-2) (CORONAVIRUS DISEASE [COVID-19]), AMPLIFIED PROBE TECHNIQUE, MAKING USE OF HIGH THROUGHPUT TECHNOLOGIES AS DESCRIBED BY CMS-2020-01-R: HCPCS

## 2020-10-22 DIAGNOSIS — J45.901: ICD-10-CM

## 2020-10-23 RX ORDER — ALBUTEROL SULFATE 90 UG/1
AEROSOL, METERED RESPIRATORY (INHALATION)
Qty: 8.5 G | Refills: 11 | Status: SHIPPED | OUTPATIENT
Start: 2020-10-23 | End: 2023-04-19

## 2021-01-20 ENCOUNTER — APPOINTMENT (OUTPATIENT)
Dept: RADIOLOGY | Facility: MEDICAL CENTER | Age: 61
End: 2021-01-20
Attending: FAMILY MEDICINE
Payer: COMMERCIAL

## 2021-01-20 DIAGNOSIS — Z12.31 VISIT FOR SCREENING MAMMOGRAM: ICD-10-CM

## 2021-03-08 ENCOUNTER — HOSPITAL ENCOUNTER (OUTPATIENT)
Dept: RADIOLOGY | Facility: MEDICAL CENTER | Age: 61
End: 2021-03-08
Attending: FAMILY MEDICINE
Payer: COMMERCIAL

## 2021-03-08 DIAGNOSIS — Z12.31 VISIT FOR SCREENING MAMMOGRAM: ICD-10-CM

## 2021-03-08 PROCEDURE — 77063 BREAST TOMOSYNTHESIS BI: CPT

## 2021-03-15 DIAGNOSIS — Z23 NEED FOR VACCINATION: ICD-10-CM

## 2021-03-18 PROBLEM — G89.29 CHRONIC PAIN OF LEFT KNEE: Status: RESOLVED | Noted: 2019-05-31 | Resolved: 2021-03-18

## 2021-03-18 PROBLEM — M25.561 CHRONIC PAIN OF RIGHT KNEE: Status: RESOLVED | Noted: 2018-05-31 | Resolved: 2021-03-18

## 2021-03-18 PROBLEM — M25.562 CHRONIC PAIN OF LEFT KNEE: Status: RESOLVED | Noted: 2019-05-31 | Resolved: 2021-03-18

## 2021-03-18 PROBLEM — R92.30 DENSE BREAST TISSUE ON MAMMOGRAM: Status: RESOLVED | Noted: 2019-05-31 | Resolved: 2021-03-18

## 2021-03-18 PROBLEM — G89.29 CHRONIC PAIN OF RIGHT KNEE: Status: RESOLVED | Noted: 2018-05-31 | Resolved: 2021-03-18

## 2021-03-18 ASSESSMENT — ENCOUNTER SYMPTOMS
VOMITING: 0
PALPITATIONS: 0
NAUSEA: 0
CHILLS: 0
DIARRHEA: 0
CONSTIPATION: 0
BLURRED VISION: 0
SHORTNESS OF BREATH: 0
WEAKNESS: 0
DEPRESSION: 0
FEVER: 0

## 2021-03-18 NOTE — PROGRESS NOTES
History of Present Illness  60 year old female presents to clinic to establish care.  She has a family history of hyperlipidemia, and is taking rosuvastatin.  She denies any side effects, no myalgias***      She denies any other questions or concerns at this time.    ROS  Review of Systems   Constitutional: Negative for chills and fever.   HENT: Negative for hearing loss.    Eyes: Negative for blurred vision.   Respiratory: Negative for shortness of breath.    Cardiovascular: Negative for chest pain and palpitations.   Gastrointestinal: Negative for constipation, diarrhea, nausea and vomiting.   Genitourinary: Negative for dysuria and hematuria.   Skin: Negative for rash.   Neurological: Negative for weakness.   Psychiatric/Behavioral: Negative for depression.     Medications  Current Outpatient Medications   Medication Sig Dispense Refill   • VENTOLIN  (90 Base) MCG/ACT Aero Soln inhalation aerosol INHALE TWO PUFFS BY MOUTH EVERY 6 HOURS AS NEEDED FOR SHORTNESS OF BREATH 8.5 g 11   • rosuvastatin (CRESTOR) 10 MG Tab TAKE ONE TABLET BY MOUTH EVERY EVENING 90 Tab 0   • albuterol 108 (90 Base) MCG/ACT Aero Soln inhalation aerosol Inhale 2 Puffs by mouth every 6 hours as needed. 8.5 g 11   • estradiol (ESTRACE) 0.1 MG/GM vaginal cream Insert 1 g in vagina every day. 42.5 g 2   • fluticasone (FLONASE) 50 MCG/ACT nasal spray Spray 1 Spray in nose 2 times a day. 16 g 0   • aspirin (ASA) 325 MG TABS Take 2 Tabs by mouth every day. 100 Tab 3   • Turmeric Curcumin 500 MG CAPS Take  by mouth every day.     • B Complex Vitamins (B COMPLEX PO) Take  by mouth.     • KRILL OIL PO Take  by mouth every day.     • fexofenadine (ALLEGRA) 60 MG TABS Take 60 mg by mouth every day.     • Cholecalciferol (VITAMIN D3) 5000 UNITS CAPS Take 1 Cap by mouth every day.     • cyanocobalamin (VITAMIN B-12) 100 MCG TABS Take 100 mcg by mouth every day.     • coenzyme Q-10 30 MG capsule Take 60 mg by mouth every day.     • docosahexanoic  acid (OMEGA 3 FA) 1000 MG CAPS Take 1,000 mg by mouth 3 times a day, with meals.       No current facility-administered medications for this visit.     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   • Chronic pain of right knee   • Postmenopausal atrophic vaginitis   • Chronic pain of left knee   • Dense breast tissue on mammogram   • Osteopenia of multiple sites     Past Medical History  Past Medical History:   Diagnosis Date   • Anesthesia     nausea   • ASTHMA     no meds   • Other specified symptom associated with female genital organs     lots of cramping, cyst   • Seasonal allergies 10/16/2014   • Thyroid disease    • Vitamin D insufficiency 10/16/2014     Past Surgical History  Past Surgical History:   Procedure Laterality Date   • THYROIDECTOMY  2015    Performed by John Valente M.D. at [facility]   • PTERYGIUM EXCISION  2010    Performed by STARLA FOWLER at [facility]   • CYSTOSCOPY  2010    Performed by FABIOLA VAZ at [facility]   • VAGINAL HYSTERECTOMY SCOPE TOTAL  2010    Performed by FABIOLA VAZ at [facility]   • SEPTOPLASTY      Dr. Jackson   • NASAL POLYPECTOMY      Dr. Craig   • OTHER ORTHOPEDIC SURGERY      rt knee arthroscopic     Past Family History  Family History   Problem Relation Age of Onset   • Other Mother         age 80. Heart attack/CABG age 60. Coronary stents.   • Heart Disease Mother    • Stroke Mother    • Hyperlipidemia Mother    • Other Father         age 80 hyperlipidemia   • Other Brother         Brother  of a heart attack at age 41   • Heart Attack Brother         Testosterone induced   • Stroke  Child         After AVM surgery   • Hyperlipidemia Brother      Social History  She reports eating a healthy and balanced diet, as well as getting regular exercise. She works full time as a ***. She drinks an average of *** alcoholic beverages per week. She denies any tobacco product or illicit drug use. She is sexually active with ***, *** partner and uses *** as contraception.     Physical Exam  LMP 07/29/2010   Physical Exam   Constitutional: She is well-developed, well-nourished, and in no distress. No distress.   HENT:   Head: Normocephalic and atraumatic.   Right Ear: Tympanic membrane, external ear and ear canal normal.   Left Ear: Tympanic membrane, external ear and ear canal normal.   Eyes: Pupils are equal, round, and reactive to light. Right eye exhibits no discharge. Left eye exhibits no discharge. No scleral icterus.   Neck: No thyromegaly present.   Cardiovascular: Normal rate, regular rhythm and normal heart sounds.   Pulmonary/Chest: Effort normal and breath sounds normal. No respiratory distress.   Abdominal: Soft. Bowel sounds are normal. She exhibits no distension. There is no abdominal tenderness.   Musculoskeletal:         General: No edema.   Neurological: She is alert.   Skin: Skin is warm and dry. She is not diaphoretic.   Psychiatric: Affect and judgment normal.     Assessment & Plan        No follow-ups on file.    Kay Tovar M.D.

## 2021-03-22 ENCOUNTER — APPOINTMENT (OUTPATIENT)
Dept: MEDICAL GROUP | Facility: MEDICAL CENTER | Age: 61
End: 2021-03-22

## 2021-04-06 ENCOUNTER — IMMUNIZATION (OUTPATIENT)
Dept: FAMILY PLANNING/WOMEN'S HEALTH CLINIC | Facility: IMMUNIZATION CENTER | Age: 61
End: 2021-04-06
Attending: INTERNAL MEDICINE
Payer: COMMERCIAL

## 2021-04-06 DIAGNOSIS — Z23 NEED FOR VACCINATION: ICD-10-CM

## 2021-04-06 DIAGNOSIS — Z23 ENCOUNTER FOR VACCINATION: Primary | ICD-10-CM

## 2021-04-06 PROCEDURE — 91300 PFIZER SARS-COV-2 VACCINE: CPT

## 2021-04-06 PROCEDURE — 0001A PFIZER SARS-COV-2 VACCINE: CPT

## 2021-04-29 ENCOUNTER — IMMUNIZATION (OUTPATIENT)
Dept: FAMILY PLANNING/WOMEN'S HEALTH CLINIC | Facility: IMMUNIZATION CENTER | Age: 61
End: 2021-04-29
Payer: COMMERCIAL

## 2021-04-29 DIAGNOSIS — Z23 ENCOUNTER FOR VACCINATION: Primary | ICD-10-CM

## 2021-04-29 PROCEDURE — 91300 PFIZER SARS-COV-2 VACCINE: CPT

## 2021-04-29 PROCEDURE — 0002A PFIZER SARS-COV-2 VACCINE: CPT

## 2021-11-05 ENCOUNTER — OFFICE VISIT (OUTPATIENT)
Dept: URGENT CARE | Facility: CLINIC | Age: 61
End: 2021-11-05
Payer: COMMERCIAL

## 2021-11-05 VITALS
TEMPERATURE: 97.9 F | DIASTOLIC BLOOD PRESSURE: 70 MMHG | BODY MASS INDEX: 22.53 KG/M2 | HEART RATE: 77 BPM | RESPIRATION RATE: 18 BRPM | HEIGHT: 64 IN | SYSTOLIC BLOOD PRESSURE: 122 MMHG | OXYGEN SATURATION: 98 % | WEIGHT: 132 LBS

## 2021-11-05 DIAGNOSIS — M17.0 OSTEOARTHRITIS OF BOTH KNEES, UNSPECIFIED OSTEOARTHRITIS TYPE: ICD-10-CM

## 2021-11-05 PROCEDURE — 99214 OFFICE O/P EST MOD 30 MIN: CPT | Performed by: PHYSICIAN ASSISTANT

## 2021-11-05 RX ORDER — ROSUVASTATIN CALCIUM 10 MG/1
10 TABLET, COATED ORAL
Qty: 90 TABLET | Refills: 0 | Status: CANCELLED | OUTPATIENT
Start: 2021-11-05

## 2021-11-05 ASSESSMENT — ENCOUNTER SYMPTOMS
NAUSEA: 0
ABDOMINAL PAIN: 0
SHORTNESS OF BREATH: 0
VOMITING: 0
MYALGIAS: 0
COUGH: 0
CHILLS: 0
CONSTIPATION: 0
EYE PAIN: 0
FEVER: 0
HEADACHES: 0
SORE THROAT: 0
DIARRHEA: 0

## 2021-11-05 NOTE — PROGRESS NOTES
"Subjective:   Sandra Franco is a 61 y.o. female who presents for Knee Problem (pain, swelling. Requesting physical therapy.)      This is a 61-year-old female with history of bilateral knee osteoarthritis who has been struggling with on again off again knee pain for last 2 to 3 years.  She recently seems to be having more issues especially after golfing or light activity.  She would like to hold off on possible knee replacement for as long as possible and is seeking a referral to physical therapy or a specialist.  She is not had any previous knee surgeries, she denies any acute injury or trauma.  She has been using over-the-counter anti-inflammatories, ice, rest, a knee sleeve which seems to help out however with exercise she has these recurrent issues.  She denies any numbness or tingling.  She is able to bear weight without difficulty.  She denies any knee instability      Review of Systems   Constitutional: Negative for chills and fever.   HENT: Negative for congestion, ear pain and sore throat.    Eyes: Negative for pain.   Respiratory: Negative for cough and shortness of breath.    Cardiovascular: Negative for chest pain.   Gastrointestinal: Negative for abdominal pain, constipation, diarrhea, nausea and vomiting.   Genitourinary: Negative for dysuria.   Musculoskeletal: Negative for myalgias.   Skin: Negative for rash.   Neurological: Negative for headaches.       Medications, Allergies, and current problem list reviewed today in Epic.     Objective:     /70 (BP Location: Left arm, Patient Position: Sitting, BP Cuff Size: Adult)   Pulse 77   Temp 36.6 °C (97.9 °F) (Temporal)   Resp 18   Ht 1.626 m (5' 4\")   Wt 59.9 kg (132 lb)   SpO2 98%     Physical Exam  Vitals reviewed.   Constitutional:       Appearance: Normal appearance.   HENT:      Head: Normocephalic and atraumatic.      Right Ear: External ear normal.      Left Ear: External ear normal.      Nose: Nose normal.      Mouth/Throat: "      Mouth: Mucous membranes are moist.   Eyes:      Conjunctiva/sclera: Conjunctivae normal.   Cardiovascular:      Rate and Rhythm: Normal rate.   Pulmonary:      Effort: Pulmonary effort is normal.   Musculoskeletal:      Comments: Trace knee effusion with mild ballottement of the patella.  Mild tenderness of the posterior aspect of the knee.  No joint line tenderness, or bony deformity.  No ecchymosis.  5/5 strength.  No ligamentous laxity appreciated with anterior/posterior drawer or Desmond's.  Distally neurovascular intact.  Ambulatory with a steady station and gait   Skin:     General: Skin is warm and dry.      Capillary Refill: Capillary refill takes less than 2 seconds.   Neurological:      Mental Status: She is alert and oriented to person, place, and time.         Assessment/Plan:     Diagnosis and associated orders:     1. Osteoarthritis of both knees, unspecified osteoarthritis type  Referral to Sports Medicine    Referral to Physical Therapy    CANCELED: Referral to Physical Therapy      Comments/MDM:     • Patient seems to been doing an excellent job with conservative management, I put in a referral to sports medicine recommend she follow-up with them prior to seeing physical therapy however did place this referral today as she had some request to a specific physical therapy office.  Discussed broad differential including osteoarthritis, knee strain/sprain.  No obvious sign of meniscal injury or ligamentous laxity suggesting ligamentous strain.         Differential diagnosis, natural history, supportive care, and indications for immediate follow-up discussed.    Advised the patient to follow-up with the primary care physician for recheck, reevaluation, and consideration of further management.    Please note that this dictation was created using voice recognition software. I have made a reasonable attempt to correct obvious errors, but I expect that there are errors of grammar and possibly content  that I did not discover before finalizing the note.    This note was electronically signed by Marcio Hansen PA-C

## 2021-11-15 ENCOUNTER — OFFICE VISIT (OUTPATIENT)
Dept: SPORTS MEDICINE | Facility: CLINIC | Age: 61
End: 2021-11-15
Payer: COMMERCIAL

## 2021-11-15 ENCOUNTER — APPOINTMENT (OUTPATIENT)
Dept: RADIOLOGY | Facility: IMAGING CENTER | Age: 61
End: 2021-11-15
Attending: FAMILY MEDICINE
Payer: COMMERCIAL

## 2021-11-15 VITALS
TEMPERATURE: 98.2 F | HEART RATE: 78 BPM | DIASTOLIC BLOOD PRESSURE: 82 MMHG | WEIGHT: 132 LBS | OXYGEN SATURATION: 97 % | BODY MASS INDEX: 22.53 KG/M2 | SYSTOLIC BLOOD PRESSURE: 124 MMHG | RESPIRATION RATE: 16 BRPM | HEIGHT: 64 IN

## 2021-11-15 DIAGNOSIS — M25.561 CHRONIC PAIN OF RIGHT KNEE: ICD-10-CM

## 2021-11-15 DIAGNOSIS — M11.261 PSEUDOGOUT OF KNEE, RIGHT: ICD-10-CM

## 2021-11-15 DIAGNOSIS — G89.29 CHRONIC PAIN OF RIGHT KNEE: ICD-10-CM

## 2021-11-15 PROCEDURE — 73564 X-RAY EXAM KNEE 4 OR MORE: CPT | Mod: TC,RT | Performed by: FAMILY MEDICINE

## 2021-11-15 PROCEDURE — 99214 OFFICE O/P EST MOD 30 MIN: CPT | Performed by: FAMILY MEDICINE

## 2021-11-15 NOTE — Clinical Note
Jude Branham,  Thank you for referring Sandra to our sports medicine clinic.  She has pseudogout.  We discussed treatment options and may give her corticosteroid injection in the future if her symptoms recur.    It is good to see you today!  L

## 2021-11-15 NOTE — PROGRESS NOTES
"CHIEF COMPLAINT:  Sandra Franco female presenting at the request of Marcio Hansen PA-C  for evaluation of knee pain.     Sandra Franco is complaining of bilateral knee pain, R > L  Flares about 1-2 times per year, progressively worseinging  Pain is at the anterolateral knee  Quality is aching, pressure  Pain is non-radiating   Improved with icing and heat  Aggravated by cycling, golfing  previous knee injury Initially back in high school, meniscal and \"partial dislocation\"  Prior Treatments: seen at , LATERAL release of the RIGHT knee  Prior studies: X-Ray, nothing recent  Medications tried for pain include: herbal remedies  Mechanical Symptom history: No Locking and Grinding which is not necessarily painful    Works in real estate  Golfing, rowing machine    REVIEW OF SYSTEMS  No Nausea, No Vomiting, No Chest Pain, No Shortness of Breath, No Dizziness, No Headache    PAST MEDICAL HISTORY:   History reviewed. No pertinent past medical history.    PMH:  has a past medical history of Anesthesia, ASTHMA, Other specified symptom associated with female genital organs, Seasonal allergies (10/16/2014), Thyroid disease, and Vitamin D insufficiency (10/16/2014). She also has no past medical history of Breast cancer (HCC).  MEDS:   Current Outpatient Medications:   •  VENTOLIN  (90 Base) MCG/ACT Aero Soln inhalation aerosol, INHALE TWO PUFFS BY MOUTH EVERY 6 HOURS AS NEEDED FOR SHORTNESS OF BREATH (Patient not taking: Reported on 11/5/2021), Disp: 8.5 g, Rfl: 11  •  rosuvastatin (CRESTOR) 10 MG Tab, TAKE ONE TABLET BY MOUTH EVERY EVENING, Disp: 90 Tab, Rfl: 0  •  albuterol 108 (90 Base) MCG/ACT Aero Soln inhalation aerosol, Inhale 2 Puffs by mouth every 6 hours as needed., Disp: 8.5 g, Rfl: 11  •  fluticasone (FLONASE) 50 MCG/ACT nasal spray, Spray 1 Spray in nose 2 times a day., Disp: 16 g, Rfl: 0  •  aspirin (ASA) 325 MG TABS, Take 2 Tabs by mouth every day., Disp: 100 Tab, Rfl: 3  •  B Complex " "Vitamins (B COMPLEX PO), Take  by mouth., Disp: , Rfl:   •  fexofenadine (ALLEGRA) 60 MG TABS, Take 60 mg by mouth every day., Disp: , Rfl:   •  Cholecalciferol (VITAMIN D3) 5000 UNITS CAPS, Take 1 Cap by mouth every day., Disp: , Rfl:   •  cyanocobalamin (VITAMIN B-12) 100 MCG TABS, Take 100 mcg by mouth every day., Disp: , Rfl:   •  coenzyme Q-10 30 MG capsule, Take 60 mg by mouth every day., Disp: , Rfl:   •  docosahexanoic acid (OMEGA 3 FA) 1000 MG CAPS, Take 1,000 mg by mouth 3 times a day, with meals., Disp: , Rfl:   ALLERGIES: No Known Allergies  SURGHX:   Past Surgical History:   Procedure Laterality Date   • THYROIDECTOMY  4/16/2015    Performed by John Valente M.D. at SURGERY SAME DAY [facility] ORS   • PTERYGIUM EXCISION  11/17/2010    Performed by STARLA FOWLER at SURGERY SAME DAY [facility] ORS   • CYSTOSCOPY  8/6/2010    Performed by FABIOLA VAZ at SURGERY SAME DAY [facility] ORS   • VAGINAL HYSTERECTOMY SCOPE TOTAL  8/6/2010    Performed by FABIOLA VAZ at SURGERY SAME DAY [facility] ORS   • SEPTOPLASTY  2006    Dr. Jackson   • NASAL POLYPECTOMY  2006    Dr. Craig   • OTHER ORTHOPEDIC SURGERY  1987    rt knee arthroscopic     SOCHX:  reports that she has never smoked. She has never used smokeless tobacco. She reports current alcohol use of about 3.0 oz of alcohol per week. She reports that she does not use drugs.  FH: Family history was reviewed, no pertinent findings to report     PHYSICAL EXAM:  /82 (BP Location: Left arm, Patient Position: Sitting, BP Cuff Size: Adult)   Pulse 78   Temp 36.8 °C (98.2 °F) (Temporal)   Resp 16   Ht 1.626 m (5' 4\")   Wt 59.9 kg (132 lb)   LMP 07/29/2010   SpO2 97%   BMI 22.66 kg/m²      well-developed, well-nourished in no apparent distress, alert and oriented x 3.  Gait: normal     RIGHT Knee:  Slight Varus and No Swelling  Range of Motion Slightly limited with Flexion  Trace effusion  Patellar No tenderness and no apprehension  Medial Joint " Line Tenderness and NEGATIVE Desmond  Lateral Joint Line Non-tender and NEGATIVE Desmond  Trace Laxity with Varus stress  Trace Laxity with Valgus stress  Lachman's testing is Trace  Posterior Drawer Testing is Trace  The leg is otherwise neurovascularly intact    LEFT Knee:  Slight Varus and No Swelling   Range of Motion Intact  Trace effusion  Patellar No tenderness and no apprehension  Medial Joint Line Non-tender and NEGATIVE Desmond  Lateral Joint Line Non-tender and NEGATIVE Desmond  Trace Laxity with Varus stress  Trace Laxity with Valgus stress  Lachman's testing is Trace  Posterior Drawer Testing is Trace  The leg is otherwise neurovascularly intact    Additional Findings: None     1. Chronic pain of right knee  DX-KNEE COMPLETE 4+ RIGHT   2. Pseudogout of knee, right       Pending PT  HEP provided    Since she is asymptomatic at this time, we can watch and wait  However, I do think she would benefit from a corticosteroid injection for her pseudogout            11/15/2021 12:03 PM     HISTORY/REASON FOR EXAM:  Atraumatic chronic right knee pain.        TECHNIQUE/EXAM DESCRIPTION AND NUMBER OF VIEWS:  4 views of the RIGHT knee.     COMPARISON: None     FINDINGS:  Mild osteopenia is present.  The alignment of the knee is within normal limits.  There is no joint effusion.  Bilateral chondrocalcinosis is present.  Minor bilateral periarticular osteophytic spurring is present.  There is marked narrowing of the lateral patellofemoral joint with superior osteophytic spurring.  There is no evidence of displaced fracture or dislocation.  There is no focal swelling.        IMPRESSION:     1.  No acute right knee fracture, dislocation, or joint effusion.     2.  Moderate osteoarthritic appearing degenerative changes of the right knee with chondrocalcinosis and marked narrowing of the lateral patellofemoral joint.             Exam Ended: 11/15/21 12:03 PM Last Resulted: 11/15/21 12:21 PM              taken here and  reviewed by me    Thank you Marcio Hansen PA-C for allowing me to participate in caring for your patient.

## 2022-02-08 ENCOUNTER — OFFICE VISIT (OUTPATIENT)
Dept: MEDICAL GROUP | Facility: MEDICAL CENTER | Age: 62
End: 2022-02-08
Payer: COMMERCIAL

## 2022-02-08 VITALS
OXYGEN SATURATION: 100 % | HEART RATE: 65 BPM | SYSTOLIC BLOOD PRESSURE: 120 MMHG | WEIGHT: 132.28 LBS | RESPIRATION RATE: 16 BRPM | DIASTOLIC BLOOD PRESSURE: 64 MMHG | HEIGHT: 64 IN | TEMPERATURE: 97 F | BODY MASS INDEX: 22.58 KG/M2

## 2022-02-08 DIAGNOSIS — Z13.1 SCREENING FOR DIABETES MELLITUS: ICD-10-CM

## 2022-02-08 DIAGNOSIS — Z12.11 SCREENING FOR COLORECTAL CANCER: ICD-10-CM

## 2022-02-08 DIAGNOSIS — R92.30 DENSE BREAST: ICD-10-CM

## 2022-02-08 DIAGNOSIS — M85.89 OSTEOPENIA OF MULTIPLE SITES: ICD-10-CM

## 2022-02-08 DIAGNOSIS — E53.9 VITAMIN B DEFICIENCY: ICD-10-CM

## 2022-02-08 DIAGNOSIS — Z12.12 SCREENING FOR COLORECTAL CANCER: ICD-10-CM

## 2022-02-08 DIAGNOSIS — E04.1 THYROID NODULE: ICD-10-CM

## 2022-02-08 DIAGNOSIS — E55.9 VITAMIN D INSUFFICIENCY: ICD-10-CM

## 2022-02-08 DIAGNOSIS — E78.49 FAMILIAL HYPERLIPIDEMIA: ICD-10-CM

## 2022-02-08 DIAGNOSIS — R92.2 DENSE BREAST: ICD-10-CM

## 2022-02-08 DIAGNOSIS — Z12.39 ENCOUNTER FOR BREAST CANCER SCREENING USING NON-MAMMOGRAM MODALITY: ICD-10-CM

## 2022-02-08 DIAGNOSIS — Z12.83 SCREENING FOR SKIN CANCER: ICD-10-CM

## 2022-02-08 PROCEDURE — 99214 OFFICE O/P EST MOD 30 MIN: CPT | Performed by: FAMILY MEDICINE

## 2022-02-08 RX ORDER — OMEPRAZOLE MAGNESIUM 10 MG/1
GRANULE, DELAYED RELEASE ORAL
COMMUNITY
End: 2023-04-19

## 2022-02-08 ASSESSMENT — ENCOUNTER SYMPTOMS
WHEEZING: 0
FOCAL WEAKNESS: 0
CONSTIPATION: 0
SINUS PAIN: 0
WEIGHT LOSS: 0
DIZZINESS: 0
VOMITING: 0
SENSORY CHANGE: 0
HEMOPTYSIS: 0
MYALGIAS: 0
DIARRHEA: 0
DEPRESSION: 0
HEADACHES: 0
CHILLS: 0
NERVOUS/ANXIOUS: 0
FEVER: 0
SHORTNESS OF BREATH: 0
PALPITATIONS: 0
SPUTUM PRODUCTION: 0
COUGH: 0
NAUSEA: 0
ABDOMINAL PAIN: 0

## 2022-02-08 ASSESSMENT — PATIENT HEALTH QUESTIONNAIRE - PHQ9: CLINICAL INTERPRETATION OF PHQ2 SCORE: 0

## 2022-02-08 NOTE — LETTER
[facility]  Rigoberto Zepeda M.D.  [address]  [place] [address]  Fax: [phone]   Authorization for Release/Disclosure of   Protected Health Information   Name: SANDRA PHILLIP PREMA : 1960 SSN: [id number]   Address: [address] [place] [address] [place] [address] Phone:    [phone] (home)    I authorize the entity listed below to release/disclose the PHI below to:   [facility]/Rigoberto Zepeda M.D. and Rigoberto Zepeda M.D.   Provider or Entity Name:     Address   City, State, Zip   Phone:      Fax:     Reason for request: continuity of care   Information to be released:    [  ] LAST COLONOSCOPY,  including any PATH REPORT and follow-up  [  ] LAST FIT/COLOGUARD RESULT [  ] LAST DEXA  [  ] LAST MAMMOGRAM  [  ] LAST PAP  [  ] LAST LABS [  ] RETINA EXAM REPORT  [  ] IMMUNIZATION RECORDS  [  ] Release all info      [  ] Check here and initial the line next to each item to release ALL health information INCLUDING  _____ Care and treatment for drug and / or alcohol abuse  _____ HIV testing, infection status, or AIDS  _____ Genetic Testing    DATES OF SERVICE OR TIME PERIOD TO BE DISCLOSED: _____________  I understand and acknowledge that:  * This Authorization may be revoked at any time by you in writing, except if your health information has already been used or disclosed.  * Your health information that will be used or disclosed as a result of you signing this authorization could be re-disclosed by the recipient. If this occurs, your re-disclosed health information may no longer be protected by State or Federal laws.  * You may refuse to sign this Authorization. Your refusal will not affect your ability to obtain treatment.  * This Authorization becomes effective upon signing and will  on (date) __________.      If no date is indicated, this Authorization will  one (1) year from the signature date.    Name: Sandra Jimenez Sander Rosa    Signature:   Date:     2022        PLEASE FAX REQUESTED RECORDS BACK TO: [phone]

## 2022-02-08 NOTE — ASSESSMENT & PLAN NOTE
Chronic health problem, recommended to take vitamin D 2000 international units daily with history of osteopenia.  Recheck vitamin D level.

## 2022-02-08 NOTE — ASSESSMENT & PLAN NOTE
Chronic health problem, recheck lipid panel to assess control.  Request records from cardiology regarding her last stress test.  Discussed we will probably switch her to Lipitor moderate dose which is 40 mg and if her cholesterol numbers are very elevated then to high-dose which will be 80 mg.

## 2022-02-08 NOTE — ASSESSMENT & PLAN NOTE
Chronic health problem, recommended to take calcium 1200 mg and vitamin D 2000 international units daily.

## 2022-02-08 NOTE — ASSESSMENT & PLAN NOTE
Chronic health problem, recheck ultrasound for right thyroid lobe nodule.  Check thyroid function test.

## 2022-02-08 NOTE — PROGRESS NOTES
FAMILY MEDICINE VISIT                                                               Chief complaint::Diagnoses of Vitamin B deficiency, Vitamin D insufficiency, Thyroid nodule, Osteopenia of multiple sites, Familial hyperlipidemia, Screening for diabetes mellitus, Screening for colorectal cancer, Encounter for breast cancer screening using non-mammogram modality, and Screening for skin cancer were pertinent to this visit.    History of present illness: Sandra Franco is a 61 y.o. female who presented to establish care    Problem   Osteopenia of Multiple Sites    Bone density scan on 6/17/2019 showed According to the World Health Organization classification, bone mineral density of this patient is osteopenia with increased risk of fracture in the lumbar region and osteopenia with increased risk of fracture in the right femur.    She denies any recent falls.     Vitamin D Insufficiency    Previous history of vitamin D insufficiency.  Recommended to take vitamin D due to history of osteopenia.     Vitamin B Deficiency    History of vitamin B12 deficiency, with recheck her labs     Thyroid Nodule    Reports history of partial thyroidectomy due to abnormal FNA biopsy for 1 left nodule.  Last ultrasound 06/20/2019 showed The left thyroid lobe has been removed. Right thyroid lobe nodule appears stable in size and echotexture.     ACR TI-RADS Recommendations  TR4 - follow up ultrasound in 1,2,3 and 5 years          Familial Hyperlipidemia    Significant family history of heart disease.  Mother had triple bypass and recently her dad also had triple bypass.  Mother has history of bilateral carotid endarterectomies.  CT cardiac scoring on 06/20/2019 showed Coronary calcification:  LMA - 0.0  LCX 22.9  .6  RCA 28.6  PDA - 0.0     Total Calcium Score: 220.1     Percentile: Calcium score is above the 90th percentile for the patient's age and sex.     Other findings:  Heart: Normal size.  Lungs: Clear.  Mediastinum:  Normal.  Upper abdomen: Normal.     IMPRESSION:     Calcium Score of 100-399 AND >75th percentile      She reports that she was seen by cardiology in [place] and had stress test done which came back negative.  Per chart she is on Crestor, reports that has not been taking medication since last 6 months.         Review of systems:     Review of Systems   Constitutional: Negative for chills, fever, malaise/fatigue and weight loss.   HENT: Negative for ear discharge, ear pain, hearing loss and sinus pain.    Respiratory: Negative for cough, hemoptysis, sputum production, shortness of breath and wheezing.    Cardiovascular: Negative for chest pain, palpitations and leg swelling.   Gastrointestinal: Negative for abdominal pain, constipation, diarrhea, nausea and vomiting.   Genitourinary: Negative for dysuria, frequency and urgency.   Musculoskeletal: Negative for joint pain and myalgias.   Skin: Negative for itching and rash.   Neurological: Negative for dizziness, sensory change, focal weakness and headaches.   Psychiatric/Behavioral: Negative for depression. The patient is not nervous/anxious.         Medications and Allergies:     Current Outpatient Medications   Medication Sig Dispense Refill   • Omeprazole Magnesium (PRILOSEC) 10 MG Pack Take  by mouth.     • VENTOLIN  (90 Base) MCG/ACT Aero Soln inhalation aerosol INHALE TWO PUFFS BY MOUTH EVERY 6 HOURS AS NEEDED FOR SHORTNESS OF BREATH 8.5 g 11   • rosuvastatin (CRESTOR) 10 MG Tab TAKE ONE TABLET BY MOUTH EVERY EVENING 90 Tab 0   • albuterol 108 (90 Base) MCG/ACT Aero Soln inhalation aerosol Inhale 2 Puffs by mouth every 6 hours as needed. 8.5 g 11   • fluticasone (FLONASE) 50 MCG/ACT nasal spray Spray 1 Spray in nose 2 times a day. 16 g 0   • aspirin (ASA) 325 MG TABS Take 2 Tabs by mouth every day. 100 Tab 3   • B Complex Vitamins (B COMPLEX PO) Take  by mouth.     • fexofenadine (ALLEGRA) 60 MG TABS Take 60 mg by mouth every day.     • cyanocobalamin  "(VITAMIN B-12) 100 MCG TABS Take 100 mcg by mouth every day.       No current facility-administered medications for this visit.          Vitals:    /64 (BP Location: Left arm, Patient Position: Sitting, BP Cuff Size: Adult)   Pulse 65   Temp 36.1 °C (97 °F)   Resp 16   Ht 1.626 m (5' 4\")   Wt 60 kg (132 lb 4.4 oz)   SpO2 100%  Body mass index is 22.71 kg/m².    Physical Exam:     Physical Exam  Constitutional:       General: She is not in acute distress.     Appearance: She is not diaphoretic.   HENT:      Head: Normocephalic and atraumatic.      Right Ear: Tympanic membrane, ear canal and external ear normal.      Left Ear: Tympanic membrane, ear canal and external ear normal.   Eyes:      General:         Right eye: No discharge.         Left eye: No discharge.      Conjunctiva/sclera: Conjunctivae normal.   Neck:      Thyroid: No thyroid mass, thyromegaly or thyroid tenderness.   Cardiovascular:      Rate and Rhythm: Normal rate and regular rhythm.      Heart sounds: Normal heart sounds. No murmur heard.      Pulmonary:      Effort: Pulmonary effort is normal. No respiratory distress.      Breath sounds: Normal breath sounds. No wheezing or rales.   Musculoskeletal:         General: No deformity. Normal range of motion.      Cervical back: Neck supple.   Skin:     General: Skin is warm.      Findings: No rash.   Neurological:      General: No focal deficit present.      Mental Status: She is alert. Mental status is at baseline.      Gait: Gait is intact.   Psychiatric:         Mood and Affect: Mood and affect normal.         Judgment: Judgment normal.            Assessment/Plan:    Problem List Items Addressed This Visit     Familial hyperlipidemia     Chronic health problem, recheck lipid panel to assess control.  Request records from cardiology regarding her last stress test.  Discussed we will probably switch her to Lipitor moderate dose which is 40 mg and if her cholesterol numbers are very " elevated then to high-dose which will be 80 mg.         Relevant Orders    Lipid Profile    Osteopenia of multiple sites     Chronic health problem, recommended to take calcium 1200 mg and vitamin D 2000 international units daily.         Relevant Orders    Comp Metabolic Panel    VITAMIN D,25 HYDROXY    Thyroid nodule     Chronic health problem, recheck ultrasound for right thyroid lobe nodule.  Check thyroid function test.         Relevant Orders    TSH WITH REFLEX TO FT4    US-THYROID    Vitamin B deficiency     Recheck vitamin B12 level.         Relevant Orders    VITAMIN B12    Vitamin D insufficiency     Chronic health problem, recommended to take vitamin D 2000 international units daily with history of osteopenia.  Recheck vitamin D level.         Relevant Orders    VITAMIN D,25 HYDROXY      Other Visit Diagnoses     Screening for diabetes mellitus        Relevant Orders    HEMOGLOBIN A1C    Screening for colorectal cancer        Relevant Orders    COLOGUARD (FIT DNA)    Encounter for breast cancer screening using non-mammogram modality  Does not want to do mammogram as she has history of dense breast   .  Would like to get ultrasound for breast cancer screening.      Relevant Orders    US-SCREENING WHOLE BREAST UNILATERAL (3D SCREENING)    Screening for skin cancer        Relevant Orders    Referral to Dermatology   Vaccination  We discussed about due vaccines, she is due for pneumonia, tetanus, shingles and flu.  Refused these vaccines at today's visit.        Please note that this dictation was created using voice recognition software. I have made every reasonable attempt to correct obvious errors, but I expect that there are errors of grammar and possibly content that I did not discover before finalizing the note.    Follow up in 2 months for follow-up

## 2022-02-23 ENCOUNTER — HOSPITAL ENCOUNTER (OUTPATIENT)
Dept: RADIOLOGY | Facility: MEDICAL CENTER | Age: 62
End: 2022-02-23
Attending: FAMILY MEDICINE
Payer: COMMERCIAL

## 2022-02-23 DIAGNOSIS — R92.30 DENSE BREAST: ICD-10-CM

## 2022-02-23 DIAGNOSIS — E04.1 THYROID NODULE: ICD-10-CM

## 2022-02-23 DIAGNOSIS — Z12.39 ENCOUNTER FOR BREAST CANCER SCREENING USING NON-MAMMOGRAM MODALITY: ICD-10-CM

## 2022-02-23 DIAGNOSIS — R92.2 DENSE BREAST: ICD-10-CM

## 2022-02-23 PROCEDURE — 76536 US EXAM OF HEAD AND NECK: CPT

## 2022-02-23 PROCEDURE — 76641 ULTRASOUND BREAST COMPLETE: CPT

## 2022-03-28 ENCOUNTER — OFFICE VISIT (OUTPATIENT)
Dept: DERMATOLOGY | Facility: IMAGING CENTER | Age: 62
End: 2022-03-28
Payer: COMMERCIAL

## 2022-03-28 DIAGNOSIS — L82.1 SK (SEBORRHEIC KERATOSIS): ICD-10-CM

## 2022-03-28 DIAGNOSIS — L81.4 LENTIGINES: ICD-10-CM

## 2022-03-28 DIAGNOSIS — Z12.83 SKIN CANCER SCREENING: ICD-10-CM

## 2022-03-28 DIAGNOSIS — D18.01 CHERRY ANGIOMA: ICD-10-CM

## 2022-03-28 DIAGNOSIS — B07.0 PLANTAR WART OF RIGHT FOOT: ICD-10-CM

## 2022-03-28 DIAGNOSIS — D22.9 MULTIPLE NEVI: ICD-10-CM

## 2022-03-28 PROCEDURE — 99213 OFFICE O/P EST LOW 20 MIN: CPT | Performed by: NURSE PRACTITIONER

## 2022-03-28 NOTE — PROGRESS NOTES
DERMATOLOGY NOTE  NEW VISIT       Chief complaint: establish care   TIM    Last exam 6 years ago   HPI: skin lesion   Location: bottom of right foot   Time present: few years   Painful lesion: No  Itching lesion: No  Enlarging lesion: Yes  Anything make it better or worse?no       History of skin cancer: No  History of precancers/actinic keratoses: No  History of biopsies:Yes, Details:  skin lesion removed from back , results benign  2012   History of blistering/severe sunburns:Yes, Details: child   Family history of skin cancer:No  Family history of atypical moles:No      No Known Allergies     MEDICATIONS:  Medications relevant to specialty reviewed.     REVIEW OF SYSTEMS:   Positive for skin (see HPI)  Negative for fevers and chills       EXAM:  LMP 07/29/2010   Constitutional: Well-developed, well-nourished, and in no distress.     A total body skin exam was performed excluding the genitals per patient preference and including the following areas: head (including face), neck, chest, abdomen, groin/buttocks, back, bilateral upper extremities, and bilateral lower extremities with the following pertinent findings listed below and/or in assessment/plan.     -sun exposed skin of trunk and b/l upper, lower extremities and face with scattered clinically benign light brown reticulated macules all of which were morphologically similar and none of which were suspicious for skin cancer today on exam    Several scattered 1-3mm bright red macules and thin papules on the trunk  Multiple tan medium brown skin-colored macules papules scattered over the trunk >> extremities, All with benign-appearing pigment network patterns on dermoscopy    Several tan stuck-on waxy papules scattered on the trunk   Tiny plantar wart on R good at base of arch            IMPRESSION / PLAN:    1. Multiple nevi  - Benign-appearing nature of lesions discussed. Advised to return to clinic for any new or concerning changes.  - ABCDE's of melanoma  discussed      2. Lentigines  - Benign-appearing nature of lesions discussed. Advised to return to clinic for any new or concerning changes.      3. Cherry angioma  - Benign-appearing nature of lesions discussed. Advised to return to clinic for any new or concerning changes.      4. SK (seborrheic keratosis)  - Benign-appearing nature of lesions discussed. Advised to return to clinic for any new or concerning changes.      5. Plantar wart of right foot  - Benign-appearing nature of lesions discussed. Advised to return to clinic for any new or concerning changes.  Declines LN2 at this time    6. Skin cancer screening  Skin cancer education  - discussed importance of sun protective clothing, eyewear  - discussed importance of daily use of broad spectrum sunscreen with SPF 30 or greater, as well as need for reapplication ~every 2 hours when exposed to UVR  - discussed importance of regular self-exams, ideally once per month, every 12 months exams in clinic  - ABCDE's of melanoma discussed  - patient to bring any new or concerning lesions to my attention  - Patient educational handout provided and reviewed with patient          Please note that this dictation was created using voice recognition software. I have made every reasonable attempt to correct obvious errors, but I expect that there are errors of grammar and possibly content that I did not discover before finalizing the note.      Return to clinic in: Return in about 1 year (around 3/28/2023) for TIM. and as needed for any new or changing skin lesions.

## 2022-04-04 ENCOUNTER — OFFICE VISIT (OUTPATIENT)
Dept: URGENT CARE | Facility: CLINIC | Age: 62
End: 2022-04-04
Payer: COMMERCIAL

## 2022-04-04 VITALS
WEIGHT: 129 LBS | SYSTOLIC BLOOD PRESSURE: 114 MMHG | RESPIRATION RATE: 16 BRPM | BODY MASS INDEX: 22.02 KG/M2 | OXYGEN SATURATION: 98 % | DIASTOLIC BLOOD PRESSURE: 84 MMHG | HEART RATE: 72 BPM | TEMPERATURE: 98.2 F | HEIGHT: 64 IN

## 2022-04-04 DIAGNOSIS — Z98.890 HISTORY OF SINUS SURGERY: ICD-10-CM

## 2022-04-04 DIAGNOSIS — R09.81 COMPLAINT OF NASAL CONGESTION: ICD-10-CM

## 2022-04-04 DIAGNOSIS — J45.20 MILD INTERMITTENT ASTHMA WITHOUT COMPLICATION: ICD-10-CM

## 2022-04-04 DIAGNOSIS — J01.00 ACUTE NON-RECURRENT MAXILLARY SINUSITIS: ICD-10-CM

## 2022-04-04 PROCEDURE — 99213 OFFICE O/P EST LOW 20 MIN: CPT | Performed by: PHYSICIAN ASSISTANT

## 2022-04-04 RX ORDER — ALBUTEROL SULFATE 90 UG/1
1-2 AEROSOL, METERED RESPIRATORY (INHALATION) EVERY 4 HOURS PRN
Qty: 1 EACH | Refills: 0 | Status: SHIPPED | OUTPATIENT
Start: 2022-04-04 | End: 2023-01-16 | Stop reason: SDUPTHER

## 2022-04-04 RX ORDER — METHYLPREDNISOLONE 4 MG/1
TABLET ORAL
Qty: 21 TABLET | Refills: 0 | Status: SHIPPED | OUTPATIENT
Start: 2022-04-04 | End: 2022-04-13

## 2022-04-04 RX ORDER — AMOXICILLIN AND CLAVULANATE POTASSIUM 875; 125 MG/1; MG/1
1 TABLET, FILM COATED ORAL 2 TIMES DAILY
Qty: 10 TABLET | Refills: 0 | Status: SHIPPED | OUTPATIENT
Start: 2022-04-04 | End: 2022-04-09

## 2022-04-04 NOTE — PROGRESS NOTES
Subjective:   Sandra Franco is a 61 y.o. female who presents for Sinus Problem (X3 weeks, possible sinus infection, allergies acting up keeping her up at night )  Patient presents with 3-week history of severe nasal congestion.  Reports has similar episodes frequently annually.  History of sinus surgery.   Severely congested, mouth breathing, somewhat dizzy with these episodes.  No headaches, Ongoing rhinorrhea.  Has been taking daily Sudafed, get some immediate relief but symptoms return.  Takes Allegra daily.  Does have Flonase but has not been using.  Does have known allergy induced asthma and uses albuterol inhaler intermittently.  Does state has had dramatic improvement with oral steroids in the past with similar episodes.  Denies fever or chills.  No shortness of breath.  No significant cough.      Medications:  albuterol Aers  aspirin Tabs  B COMPLEX PO  cyanocobalamin Tabs  fexofenadine Tabs  fluticasone  PriLOSEC Pack  rosuvastatin Tabs  Ventolin HFA Aers    Allergies:             Patient has no known allergies.    Surgical History:         Past Surgical History:   Procedure Laterality Date   • THYROIDECTOMY  4/16/2015    Performed by John Valente M.D. at SURGERY SAME DAY [facility]   • PTERYGIUM EXCISION  11/17/2010    Performed [facility] STARLA FOWLER [facility]   • CYSTOSCOPY  8/6/2010    Performed by FABIOLA VAZ at SURGERY SAME DAY [facility] ORS   • VAGINAL HYSTERECTOMY SCOPE TOTAL  8/6/2010    Performed by FAIBOLA VAZ [facility]   • SEPTOPLASTY  2006    Dr. Jackson   • NASAL POLYPECTOMY  2006    Dr. Craig   • OTHER ORTHOPEDIC SURGERY  1987    rt knee arthroscopic       Past Social Hx:  Sandra Franco  reports that she has never smoked. She has never used smokeless tobacco. She reports current alcohol use. She reports that she does not use drugs.     Past Family Hx:   Sandra Franco family history includes Heart Attack in  "her brother and father; Heart Disease in her mother; Hyperlipidemia in her brother and mother; Other in her brother, father, and mother; Stroke in her child and mother.       Problem list, medications, and allergies reviewed by myself today in Epic.     Objective:     /84   Pulse 72   Temp 36.8 °C (98.2 °F) (Temporal)   Resp 16   Ht 1.626 m (5' 4\")   Wt 58.5 kg (129 lb)   LMP 07/29/2010   SpO2 98%   Breastfeeding No   BMI 22.14 kg/m²     Physical Exam  Vitals reviewed.   Constitutional:       General: She is not in acute distress.     Appearance: She is well-developed. She is not ill-appearing, toxic-appearing or diaphoretic.   HENT:      Head: Normocephalic.      Right Ear: Ear canal and external ear normal. Tympanic membrane is injected.      Left Ear: Ear canal and external ear normal. Tympanic membrane is injected.      Nose: Mucosal edema and rhinorrhea present.      Right Sinus: Maxillary sinus tenderness and frontal sinus tenderness present.      Left Sinus: Maxillary sinus tenderness and frontal sinus tenderness present.      Mouth/Throat:      Mouth: Mucous membranes are dry.      Pharynx: Uvula midline. Posterior oropharyngeal erythema present. No uvula swelling.   Eyes:      Conjunctiva/sclera: Conjunctivae normal.      Pupils: Pupils are equal, round, and reactive to light.   Cardiovascular:      Rate and Rhythm: Normal rate and regular rhythm.   Pulmonary:      Effort: Pulmonary effort is normal. No accessory muscle usage or respiratory distress.      Breath sounds: Normal breath sounds. No decreased breath sounds, wheezing, rhonchi or rales.   Musculoskeletal:         General: Normal range of motion.      Cervical back: Normal range of motion and neck supple.   Lymphadenopathy:      Cervical: No cervical adenopathy.   Skin:     General: Skin is warm and dry.   Neurological:      Mental Status: She is alert and oriented to person, place, and time.   Psychiatric:         Behavior: Behavior " is cooperative.         Assessment/Plan:     Diagnosis and Associated Orders:     1. Complaint of nasal congestion    2. Acute non-recurrent maxillary sinusitis  - methylPREDNISolone (MEDROL DOSEPAK) 4 MG Tablet Therapy Pack; Follow schedule on package instructions.  Dispense: 21 Tablet; Refill: 0  - amoxicillin-clavulanate (AUGMENTIN) 875-125 MG Tab; Take 1 Tablet by mouth 2 times a day for 5 days.  Dispense: 10 Tablet; Refill: 0    3. Mild intermittent asthma without complication  - albuterol 108 (90 Base) MCG/ACT Aero Soln inhalation aerosol; Inhale 1-2 Puffs every four hours as needed for Shortness of Breath.  Dispense: 1 Each; Refill: 0    4. History of sinus surgery        Comments/MDM:  Patient has been symptomatic over 3 weeks.  She has a history of sinusitis that has been responsive to steroids in the past.  Given duration of symptoms I am also recommending antibiotic treatment.  Medrol Dosepak provided.  Albuterol inhaler refill    -Increase water intake  -May use over the counter Ibuprofen/Tylenol as needed for any fever, body aches or throat pain  -May take long acting antihistamine for seasonal allergy symptoms and post-nasal drip as needed  -May use over the counter saline nasal spray for nasal lavage for nasal congestion as needed  -May use over the counter Nasacort/Flonase for nasal congestion as needed   -May use throat lozenges for throat discomfort as needed   -May gargle with salt water up to 4x/day as needed for throat discomfort (1 tsp salt dissolved in 1 cup warm water)  -Monitor for increased sinus pain/pressure with sinus congestion with thick mucus production, sinus headache, cough, shortness of breath, fever- need re-evaluation        I personally reviewed prior external notes and test results pertinent to today's visit.  Red flags discussed as well as indications to present to the Emergency Department.  Supportive care, natural history, differential diagnoses, and indications for  immediate follow-up discussed.  Patient expresses understanding and agrees to plan.  Patient denies any other questions or concerns.    Follow-up with the primary care physician for recheck, reevaluation, and consideration of further management.      Please note that this dictation was created using voice recognition software. I have made a reasonable attempt to correct obvious errors, but I expect that there are errors of grammar and possibly content that I did not discover before finalizing the note.    This note was electronically signed by Doreen Buckley PA-C

## 2022-04-13 ENCOUNTER — OFFICE VISIT (OUTPATIENT)
Dept: MEDICAL GROUP | Facility: MEDICAL CENTER | Age: 62
End: 2022-04-13
Payer: COMMERCIAL

## 2022-04-13 VITALS
DIASTOLIC BLOOD PRESSURE: 56 MMHG | RESPIRATION RATE: 16 BRPM | OXYGEN SATURATION: 95 % | WEIGHT: 136.69 LBS | BODY MASS INDEX: 23.34 KG/M2 | TEMPERATURE: 97.2 F | HEART RATE: 85 BPM | HEIGHT: 64 IN | SYSTOLIC BLOOD PRESSURE: 122 MMHG

## 2022-04-13 DIAGNOSIS — E04.1 THYROID NODULE: ICD-10-CM

## 2022-04-13 DIAGNOSIS — B00.1 RECURRENT COLD SORES: ICD-10-CM

## 2022-04-13 DIAGNOSIS — E06.9 THYROIDITIS: ICD-10-CM

## 2022-04-13 DIAGNOSIS — E78.49 FAMILIAL HYPERLIPIDEMIA: ICD-10-CM

## 2022-04-13 DIAGNOSIS — J30.2 SEASONAL ALLERGIES: ICD-10-CM

## 2022-04-13 PROCEDURE — 99214 OFFICE O/P EST MOD 30 MIN: CPT | Performed by: FAMILY MEDICINE

## 2022-04-13 RX ORDER — VALACYCLOVIR HYDROCHLORIDE 500 MG/1
500 TABLET, FILM COATED ORAL 2 TIMES DAILY
Qty: 40 TABLET | Refills: 2 | Status: SHIPPED | OUTPATIENT
Start: 2022-04-13 | End: 2024-02-12 | Stop reason: SDUPTHER

## 2022-04-13 RX ORDER — AZELASTINE 1 MG/ML
1 SPRAY, METERED NASAL 2 TIMES DAILY
Qty: 30 ML | Refills: 1 | Status: SHIPPED | OUTPATIENT
Start: 2022-04-13 | End: 2023-04-19

## 2022-04-13 RX ORDER — TRIAMCINOLONE ACETONIDE 40 MG/ML
40 INJECTION, SUSPENSION INTRA-ARTICULAR; INTRAMUSCULAR ONCE
Status: COMPLETED | OUTPATIENT
Start: 2022-04-13 | End: 2022-04-13

## 2022-04-13 RX ORDER — ATORVASTATIN CALCIUM 40 MG/1
40 TABLET, FILM COATED ORAL NIGHTLY
Qty: 90 TABLET | Refills: 3 | Status: SHIPPED | OUTPATIENT
Start: 2022-04-13 | End: 2022-09-14

## 2022-04-13 RX ADMIN — TRIAMCINOLONE ACETONIDE 40 MG: 40 INJECTION, SUSPENSION INTRA-ARTICULAR; INTRAMUSCULAR at 15:21

## 2022-04-13 ASSESSMENT — ENCOUNTER SYMPTOMS
WHEEZING: 0
PALPITATIONS: 0
CHILLS: 0
SHORTNESS OF BREATH: 0
FEVER: 0
COUGH: 0

## 2022-04-13 NOTE — ASSESSMENT & PLAN NOTE
New health problem, check TPO antibody and antithyroid globin antibody.  Check thyroid function test also.

## 2022-04-13 NOTE — ASSESSMENT & PLAN NOTE
Chronic health problem, uncontrolled, recheck labs, start Lipitor 40 mg daily.  Eat healthy diet and to exercise.

## 2022-04-13 NOTE — PROGRESS NOTES
FAMILY MEDICINE VISIT                                                               Chief complaint::Diagnoses of Seasonal allergies, Recurrent cold sores, Thyroiditis, Thyroid nodule, and Familial hyperlipidemia were pertinent to this visit.    History of present illness: Sandra Franco is a 61 y.o. female who presented for worsening seasonal allergies, thyroid ultrasound follow-up.    Problem   Recurrent Cold Sores    She has history of recurrent cold sores, requested refill for Valtrex.     Thyroiditis    Recent ultrasound on 2/23/2022 showed Left thyroidectomy. No mass lesions in the surgical bed.  2.  A 1.36 cm right thyroid nodule, slightly larger since prior study, but unchanged in morphology since 2014. It is benign and does not require follow-up.  3.  Slightly increased vascularity of the right thyroid lobe, which may be due to thyroiditis     Seasonal Allergies    She has seasonal allergies, has nasal polyps also, currently following with allergist.  She has been on aspirin 325 mg twice daily to prevent exacerbation of nasal polyps.  She is following with ENT.  She was given Medrol Dosepak and she has been using Flonase, symptoms are not getting much better.  She has itching in her nose and as well as inflammation in her nose.     Thyroid Nodule    Reports history of partial thyroidectomy due to abnormal FNA biopsy for 1 left nodule.  Last ultrasound 06/20/2019 showed The left thyroid lobe has been removed. Right thyroid lobe nodule appears stable in size and echotexture.     ACR TI-RADS Recommendations  TR4 - follow up ultrasound in 1,2,3 and 5 years.      Recent thyroid ultrasound 2/23/2022  showed left thyroidectomy. No mass lesions in the surgical bed.  2.  A 1.36 cm right thyroid nodule, slightly larger since prior study, but unchanged in morphology since 2014. It is benign and does not require follow-up.  3.  Slightly increased vascularity of the right thyroid lobe, which may be due to  thyroiditis          Familial Hyperlipidemia    Significant family history of heart disease.  Mother had triple bypass and recently her dad also had triple bypass.  Mother has history of bilateral carotid endarterectomies.  CT cardiac scoring on 06/20/2019 showed Coronary calcification:  LMA - 0.0  LCX 22.9  .6  RCA 28.6  PDA - 0.0     Total Calcium Score: 220.1     Percentile: Calcium score is above the 90th percentile for the patient's age and sex.     Other findings:  Heart: Normal size.  Lungs: Clear.  Mediastinum: Normal.  Upper abdomen: Normal.     IMPRESSION:     Calcium Score of 100-399 AND >75th percentile    She was on Crestor medication before but had side effects with Crestor medication.  She reports that she was able to tolerate Lipitor previously.         Review of systems:     Review of Systems   Constitutional: Negative for chills and fever.   HENT: Positive for congestion.         Nasal polyps, itching in nose   Respiratory: Negative for cough, shortness of breath and wheezing.    Cardiovascular: Negative for chest pain, palpitations and leg swelling.        Medications and Allergies:     Current Outpatient Medications   Medication Sig Dispense Refill   • azelastine (ASTELIN) 137 MCG/SPRAY nasal spray Administer 1 Spray into affected nostril(S) 2 times a day. 30 mL 1   • atorvastatin (LIPITOR) 40 MG Tab Take 1 Tablet by mouth every evening. 90 Tablet 3   • valACYclovir (VALTREX) 500 MG Tab Take 1 Tablet by mouth 2 times a day. Take this medication for episodes of cold sores 40 Tablet 2   • albuterol 108 (90 Base) MCG/ACT Aero Soln inhalation aerosol Inhale 1-2 Puffs every four hours as needed for Shortness of Breath. 1 Each 0   • Omeprazole Magnesium (PRILOSEC) 10 MG Pack Take  by mouth.     • VENTOLIN  (90 Base) MCG/ACT Aero Soln inhalation aerosol INHALE TWO PUFFS BY MOUTH EVERY 6 HOURS AS NEEDED FOR SHORTNESS OF BREATH 8.5 g 11   • fluticasone (FLONASE) 50 MCG/ACT nasal spray Spray  "1 Spray in nose 2 times a day. 16 g 0   • aspirin (ASA) 325 MG TABS Take 2 Tabs by mouth every day. 100 Tab 3   • B Complex Vitamins (B COMPLEX PO) Take  by mouth.     • fexofenadine (ALLEGRA) 60 MG TABS Take 60 mg by mouth every day.     • cyanocobalamin (VITAMIN B-12) 100 MCG TABS Take 100 mcg by mouth every day.       No current facility-administered medications for this visit.          Vitals:    /56 (BP Location: Left arm, Patient Position: Sitting, BP Cuff Size: Adult)   Pulse 85   Temp 36.2 °C (97.2 °F)   Resp 16   Ht 1.626 m (5' 4\")   Wt 62 kg (136 lb 11 oz)   SpO2 95%  Body mass index is 23.46 kg/m².    Physical Exam:     Physical Exam  Constitutional:       General: She is not in acute distress.  HENT:      Head: Normocephalic and atraumatic.      Nose: Mucosal edema present.      Right Turbinates: Enlarged.      Left Turbinates: Enlarged.   Eyes:      Conjunctiva/sclera: Conjunctivae normal.   Cardiovascular:      Rate and Rhythm: Normal rate.   Pulmonary:      Effort: Pulmonary effort is normal. No respiratory distress.   Musculoskeletal:         General: No deformity.      Cervical back: Neck supple.   Skin:     Findings: No rash.   Neurological:      Mental Status: She is alert.      Gait: Gait is intact.   Psychiatric:         Mood and Affect: Mood and affect normal.         Judgment: Judgment normal.          Assessment/Plan:    Problem List Items Addressed This Visit     Familial hyperlipidemia     Chronic health problem, uncontrolled, recheck labs, start Lipitor 40 mg daily.  Eat healthy diet and to exercise.         Relevant Medications    atorvastatin (LIPITOR) 40 MG Tab    Recurrent cold sores     Chronic health problem, stable, refilled Valtrex for acute exacerbations.         Relevant Medications    valACYclovir (VALTREX) 500 MG Tab    Seasonal allergies     Chronic health problem, having exacerbation of allergies, given Kenalog shot in office.  Start azelastine nasal spray.  " Recommended to use [place] pot.         Relevant Medications    azelastine (ASTELIN) 137 MCG/SPRAY nasal spray    Thyroid nodule     Chronic health problem, stable, no further monitoring for nodules recommended.         Thyroiditis     New health problem, check TPO antibody and antithyroid globin antibody.  Check thyroid function test also.         Relevant Orders    THYROID PEROXIDASE  (TPO) AB    ANTITHYROGLOBULIN AB      Discussed with patient about pending vaccine, she would like to hold onto these vaccines.    Please note that this dictation was created using voice recognition software. I have made every reasonable attempt to correct obvious errors, but I expect that there are errors of grammar and possibly content that I did not discover before finalizing the note.    Follow up in 2 months for lab follow-up.

## 2022-04-13 NOTE — ASSESSMENT & PLAN NOTE
Chronic health problem, having exacerbation of allergies, given Kenalog shot in office.  Start azelastine nasal spray.  Recommended to use [place] pot.

## 2022-06-14 ENCOUNTER — APPOINTMENT (OUTPATIENT)
Dept: MEDICAL GROUP | Facility: MEDICAL CENTER | Age: 62
End: 2022-06-14
Payer: COMMERCIAL

## 2022-08-22 ENCOUNTER — APPOINTMENT (OUTPATIENT)
Dept: MEDICAL GROUP | Facility: MEDICAL CENTER | Age: 62
End: 2022-08-22

## 2022-09-14 ENCOUNTER — OFFICE VISIT (OUTPATIENT)
Dept: MEDICAL GROUP | Facility: MEDICAL CENTER | Age: 62
End: 2022-09-14

## 2022-09-14 VITALS
OXYGEN SATURATION: 97 % | RESPIRATION RATE: 16 BRPM | HEART RATE: 70 BPM | DIASTOLIC BLOOD PRESSURE: 68 MMHG | BODY MASS INDEX: 23.34 KG/M2 | SYSTOLIC BLOOD PRESSURE: 130 MMHG | HEIGHT: 64 IN | WEIGHT: 136.69 LBS | TEMPERATURE: 97.5 F

## 2022-09-14 DIAGNOSIS — E78.49 FAMILIAL HYPERLIPIDEMIA: ICD-10-CM

## 2022-09-14 DIAGNOSIS — E06.9 THYROIDITIS: ICD-10-CM

## 2022-09-14 PROCEDURE — 99214 OFFICE O/P EST MOD 30 MIN: CPT | Performed by: FAMILY MEDICINE

## 2022-09-14 RX ORDER — ROSUVASTATIN CALCIUM 20 MG/1
20 TABLET, COATED ORAL EVERY EVENING
Qty: 30 TABLET | Refills: 0 | Status: SHIPPED | OUTPATIENT
Start: 2022-09-14 | End: 2023-06-05

## 2022-09-14 ASSESSMENT — ENCOUNTER SYMPTOMS
CHILLS: 0
PALPITATIONS: 0
FEVER: 0

## 2022-09-14 NOTE — PROGRESS NOTES
FAMILY MEDICINE VISIT                                                               Chief complaint::Diagnoses of Familial hyperlipidemia and Thyroiditis were pertinent to this visit.    History of present illness: Sandra Franco is a 62 y.o. female who presented for lab follow-up.  She forgot to do her labs.    Problem   Thyroiditis    Recent ultrasound on 2/23/2022 showed Left thyroidectomy. No mass lesions in the surgical bed.  2.  A 1.36 cm right thyroid nodule, slightly larger since prior study, but unchanged in morphology since 2014. It is benign and does not require follow-up.  3.  Slightly increased vascularity of the right thyroid lobe, which may be due to thyroiditis     Familial Hyperlipidemia    Significant family history of heart disease.  Mother had triple bypass and recently her dad also had triple bypass.  Mother has history of bilateral carotid endarterectomies.  CT cardiac scoring on 06/20/2019 showed Coronary calcification:  LMA - 0.0  LCX 22.9  .6  RCA 28.6  PDA - 0.0     Total Calcium Score: 220.1     Percentile: Calcium score is above the 90th percentile for the patient's age and sex.     Other findings:  Heart: Normal size.  Lungs: Clear.  Mediastinum: Normal.  Upper abdomen: Normal.     IMPRESSION:     Calcium Score of 100-399 AND >75th percentile    We will start her on Lipitor 40 mg daily at last visit.  She reported she has been having stomach issues with Lipitor.  She is having excessive gas with this medication        Review of systems:     Review of Systems   Constitutional:  Negative for chills and fever.   Cardiovascular:  Negative for chest pain, palpitations and leg swelling.      Medications and Allergies:     Current Outpatient Medications   Medication Sig Dispense Refill    rosuvastatin (CRESTOR) 20 MG Tab Take 1 Tablet by mouth every evening. 30 Tablet 0    azelastine (ASTELIN) 137 MCG/SPRAY nasal spray Administer 1 Spray into affected nostril(S) 2 times a day. 30  "mL 1    valACYclovir (VALTREX) 500 MG Tab Take 1 Tablet by mouth 2 times a day. Take this medication for episodes of cold sores 40 Tablet 2    albuterol 108 (90 Base) MCG/ACT Aero Soln inhalation aerosol Inhale 1-2 Puffs every four hours as needed for Shortness of Breath. 1 Each 0    Omeprazole Magnesium (PRILOSEC) 10 MG Pack Take  by mouth.      VENTOLIN  (90 Base) MCG/ACT Aero Soln inhalation aerosol INHALE TWO PUFFS BY MOUTH EVERY 6 HOURS AS NEEDED FOR SHORTNESS OF BREATH 8.5 g 11    fluticasone (FLONASE) 50 MCG/ACT nasal spray Spray 1 Spray in nose 2 times a day. 16 g 0    aspirin (ASA) 325 MG TABS Take 2 Tabs by mouth every day. 100 Tab 3    B Complex Vitamins (B COMPLEX PO) Take  by mouth.      fexofenadine (ALLEGRA) 60 MG TABS Take 60 mg by mouth every day.      cyanocobalamin (VITAMIN B-12) 100 MCG TABS Take 100 mcg by mouth every day.       No current facility-administered medications for this visit.          Vitals:    /68 (BP Location: Left arm, Patient Position: Sitting, BP Cuff Size: Adult)   Pulse 70   Temp 36.4 °C (97.5 °F)   Resp 16   Ht 1.626 m (5' 4\")   Wt 62 kg (136 lb 11 oz)   SpO2 97%  Body mass index is 23.46 kg/m².    Physical Exam:     Physical Exam  Constitutional:       General: She is not in acute distress.  HENT:      Head: Normocephalic and atraumatic.      Right Ear: External ear normal.      Left Ear: External ear normal.   Eyes:      Conjunctiva/sclera: Conjunctivae normal.   Cardiovascular:      Rate and Rhythm: Normal rate.   Pulmonary:      Effort: Pulmonary effort is normal. No respiratory distress.   Musculoskeletal:         General: No deformity.      Cervical back: Neck supple.   Skin:     Findings: No rash.   Neurological:      Mental Status: She is alert.      Gait: Gait is intact.   Psychiatric:         Mood and Affect: Mood and affect normal.         Judgment: Judgment normal.        Assessment/Plan:         Problem List Items Addressed This Visit       " Familial hyperlipidemia     Chronic problem, uncontrolled, stop Lipitor as she is having side effects.  Start Crestor 20 mg daily.  Recheck labs ordered at last visit.  Recommended to do labs before next visit.         Relevant Medications    rosuvastatin (CRESTOR) 20 MG Tab    Thyroiditis     Chronic problem, thyroid function test, thyroid antibodies ordered at last visit for further evaluation for thyroiditis.        We discussed about vaccines, does not want to get vaccine does report that she is healthy and never got flu or pneumonia before.    Please note that this dictation was created using voice recognition software. I have made every reasonable attempt to correct obvious errors, but I expect that there are errors of grammar and possibly content that I did not discover before finalizing the note.    Follow up in 2 to 3 months for lab follow-up.

## 2022-09-14 NOTE — ASSESSMENT & PLAN NOTE
Chronic problem, uncontrolled, stop Lipitor as she is having side effects.  Start Crestor 20 mg daily.  Recheck labs ordered at last visit.  Recommended to do labs before next visit.

## 2022-09-14 NOTE — ASSESSMENT & PLAN NOTE
Chronic problem, thyroid function test, thyroid antibodies ordered at last visit for further evaluation for thyroiditis.

## 2022-11-02 ENCOUNTER — PATIENT MESSAGE (OUTPATIENT)
Dept: HEALTH INFORMATION MANAGEMENT | Facility: OTHER | Age: 62
End: 2022-11-02

## 2022-12-01 ENCOUNTER — TELEPHONE (OUTPATIENT)
Dept: HEALTH INFORMATION MANAGEMENT | Facility: OTHER | Age: 62
End: 2022-12-01

## 2022-12-01 DIAGNOSIS — M25.552 LEFT HIP PAIN: ICD-10-CM

## 2022-12-01 NOTE — TELEPHONE ENCOUNTER
1. Caller Name: Sandra Franco      Call Back Number: [phone] (home)         How would the patient prefer to be contacted with a response: MyChart message    2. SPECIFIC Action To Be Taken: Referral pending, please sign.    3. Diagnosis/Clinical Reason for Request: Patient states that she is having problems with her left Hip and would like to be evaluated.     4. Specialty & Provider Name/Lab/Imaging Location: [facility]    5. Is appointment scheduled for requested order/referral: no    Patient was informed they will receive a return phone call from the office ONLY if there are any questions before processing their request. Advised to call back if they haven't received a call from the referral department in 5 days. .

## 2022-12-15 ENCOUNTER — APPOINTMENT (OUTPATIENT)
Dept: MEDICAL GROUP | Facility: MEDICAL CENTER | Age: 62
End: 2022-12-15

## 2023-01-13 ENCOUNTER — APPOINTMENT (OUTPATIENT)
Dept: MEDICAL GROUP | Facility: MEDICAL CENTER | Age: 63
End: 2023-01-13
Payer: COMMERCIAL

## 2023-02-08 ENCOUNTER — APPOINTMENT (OUTPATIENT)
Dept: MEDICAL GROUP | Facility: MEDICAL CENTER | Age: 63
End: 2023-02-08
Payer: COMMERCIAL

## 2023-03-10 ENCOUNTER — TELEPHONE (OUTPATIENT)
Dept: HEALTH INFORMATION MANAGEMENT | Facility: OTHER | Age: 63
End: 2023-03-10
Payer: COMMERCIAL

## 2023-03-10 DIAGNOSIS — Z01.00 ENCOUNTER FOR VISION SCREENING: ICD-10-CM

## 2023-03-10 NOTE — TELEPHONE ENCOUNTER
1. Caller Name: Sandra Franco                        Call Back Number: [phone]      How would the patient prefer to be contacted with a response: MyChart message    2. SPECIFIC Action To Be Taken: Referral pending, please sign.    3. Diagnosis/Clinical Reason for Request: Annual exam    4. Specialty & Provider Name/Lab/Imaging Location: Ophthalmology , Dr Jad bills    5. Is appointment scheduled for requested order/referral: no    Patient was informed they will receive a return phone call from the office ONLY if there are any questions before processing their request. Advised to call back if they haven't received a call from the referral department in 5 days.    Patient note:   Sandra Franco would like to request a referral.  Reason: Annual exam  Requested provider: Dr Jad Bills Opthamologist  Comment:  Saw my optometrist and he wants me to see my Opthamologist for a better screening.

## 2023-04-19 ENCOUNTER — OFFICE VISIT (OUTPATIENT)
Dept: MEDICAL GROUP | Facility: MEDICAL CENTER | Age: 63
End: 2023-04-19
Payer: COMMERCIAL

## 2023-04-19 VITALS
HEIGHT: 64 IN | SYSTOLIC BLOOD PRESSURE: 132 MMHG | DIASTOLIC BLOOD PRESSURE: 70 MMHG | HEART RATE: 75 BPM | WEIGHT: 136.69 LBS | TEMPERATURE: 96.8 F | RESPIRATION RATE: 16 BRPM | OXYGEN SATURATION: 96 % | BODY MASS INDEX: 23.34 KG/M2

## 2023-04-19 DIAGNOSIS — E78.49 FAMILIAL HYPERLIPIDEMIA: ICD-10-CM

## 2023-04-19 DIAGNOSIS — Z13.1 SCREENING FOR DIABETES MELLITUS: ICD-10-CM

## 2023-04-19 DIAGNOSIS — E04.1 THYROID NODULE: ICD-10-CM

## 2023-04-19 DIAGNOSIS — Z11.4 SCREENING FOR HIV (HUMAN IMMUNODEFICIENCY VIRUS): ICD-10-CM

## 2023-04-19 DIAGNOSIS — E55.9 VITAMIN D INSUFFICIENCY: ICD-10-CM

## 2023-04-19 DIAGNOSIS — Z12.31 ENCOUNTER FOR SCREENING MAMMOGRAM FOR MALIGNANT NEOPLASM OF BREAST: ICD-10-CM

## 2023-04-19 DIAGNOSIS — E53.9 VITAMIN B DEFICIENCY: ICD-10-CM

## 2023-04-19 PROBLEM — E78.5 DYSLIPIDEMIA: Status: RESOLVED | Noted: 2023-04-19 | Resolved: 2023-04-19

## 2023-04-19 PROBLEM — E78.5 DYSLIPIDEMIA: Status: ACTIVE | Noted: 2023-04-19

## 2023-04-19 PROCEDURE — 99214 OFFICE O/P EST MOD 30 MIN: CPT | Performed by: FAMILY MEDICINE

## 2023-04-19 ASSESSMENT — ENCOUNTER SYMPTOMS
CHILLS: 0
FEVER: 0
PALPITATIONS: 0

## 2023-04-19 ASSESSMENT — PATIENT HEALTH QUESTIONNAIRE - PHQ9: CLINICAL INTERPRETATION OF PHQ2 SCORE: 0

## 2023-04-19 NOTE — ASSESSMENT & PLAN NOTE
Chronic problem, stable, check labs including TSH, thyroid antibodies.  We will decide further management based on these results

## 2023-04-19 NOTE — ASSESSMENT & PLAN NOTE
Chronic problem, unstable based on last labs.  Recheck labs to assess control.  Continue Crestor 10 mg daily.

## 2023-04-19 NOTE — PROGRESS NOTES
FAMILY MEDICINE VISIT                                                               Chief complaint::Diagnoses of Vitamin D insufficiency, Vitamin B deficiency, Thyroid nodule, Screening for diabetes mellitus, Encounter for screening mammogram for malignant neoplasm of breast, Screening for HIV (human immunodeficiency virus), and Familial hyperlipidemia were pertinent to this visit.    History of present illness: Sandra Franco is a 62 y.o. female who presented for labs and mammogram.    Problem   Vitamin D Insufficiency    Previous history of vitamin D insufficiency.  Recommended to take vitamin D due to history of osteopenia.     Vitamin B Deficiency    History of vitamin B12 deficiency.  She is taking vitamin B12 supplementation daily.     Thyroid Nodule    Reports history of partial thyroidectomy due to abnormal FNA biopsy for 1 left nodule.  Last ultrasound 06/20/2019 showed The left thyroid lobe has been removed. Right thyroid lobe nodule appears stable in size and echotexture.     ACR TI-RADS Recommendations  TR4 - follow up ultrasound in 1,2,3 and 5 years.      Recent thyroid ultrasound 2/23/2022  showed left thyroidectomy. No mass lesions in the surgical bed.  2.  A 1.36 cm right thyroid nodule, slightly larger since prior study, but unchanged in morphology since 2014. It is benign and does not require follow-up.  3.  Slightly increased vascularity of the right thyroid lobe, which may be due to thyroiditis     Currently does not have any symptoms     Familial Hyperlipidemia    Significant family history of heart disease.  Mother had triple bypass and recently her dad also had triple bypass.  Mother has history of bilateral carotid endarterectomies.  CT cardiac scoring on 06/20/2019 showed Coronary calcification:  LMA - 0.0  LCX 22.9  .6  RCA 28.6  PDA - 0.0     Total Calcium Score: 220.1     Percentile: Calcium score is above the 90th percentile for the patient's age and sex.     Other  "findings:  Heart: Normal size.  Lungs: Clear.  Mediastinum: Normal.  Upper abdomen: Normal.     IMPRESSION:     Calcium Score of 100-399 AND >75th percentile    Lab Results   Component Value Date/Time    CHOLSTRLTOT 302 (H) 06/27/2019 1025    TRIGLYCERIDE 87 06/27/2019 1025    HDL 90 06/27/2019 1025     (H) 06/27/2019 1025     She is on Crestor 20 mg daily.               Review of systems:     Review of Systems   Constitutional:  Negative for chills, fever and malaise/fatigue.   Cardiovascular:  Negative for chest pain, palpitations and leg swelling.      Medications and Allergies:     Current Outpatient Medications   Medication Sig Dispense Refill    albuterol 108 (90 Base) MCG/ACT Aero Soln inhalation aerosol Inhale 1-2 Puffs every four hours as needed for Shortness of Breath. 1 Each 0    rosuvastatin (CRESTOR) 20 MG Tab Take 1 Tablet by mouth every evening. 30 Tablet 0    valACYclovir (VALTREX) 500 MG Tab Take 1 Tablet by mouth 2 times a day. Take this medication for episodes of cold sores 40 Tablet 2    fluticasone (FLONASE) 50 MCG/ACT nasal spray Spray 1 Spray in nose 2 times a day. 16 g 0    aspirin (ASA) 325 MG TABS Take 2 Tabs by mouth every day. 100 Tab 3    B Complex Vitamins (B COMPLEX PO) Take  by mouth.      fexofenadine (ALLEGRA) 60 MG TABS Take 60 mg by mouth every day.      cyanocobalamin (VITAMIN B-12) 100 MCG TABS Take 100 mcg by mouth every day.       No current facility-administered medications for this visit.          Vitals:    /70   Pulse 75   Temp 36 °C (96.8 °F)   Resp 16   Ht 1.626 m (5' 4\")   Wt 62 kg (136 lb 11 oz)   SpO2 96%  Body mass index is 23.46 kg/m².    Physical Exam:     Physical Exam  Constitutional:       Appearance: Normal appearance. She is well-developed and well-groomed.   HENT:      Head: Normocephalic and atraumatic.      Right Ear: External ear normal.      Left Ear: External ear normal.   Eyes:      General:         Right eye: No discharge.         " Left eye: No discharge.      Conjunctiva/sclera: Conjunctivae normal.   Cardiovascular:      Rate and Rhythm: Normal rate.   Pulmonary:      Effort: Pulmonary effort is normal. No respiratory distress.   Musculoskeletal:      Cervical back: Neck supple.   Skin:     Findings: No rash.   Neurological:      Mental Status: She is alert.   Psychiatric:         Mood and Affect: Mood and affect normal.         Behavior: Behavior normal.          Assessment/Plan:         Problem List Items Addressed This Visit       Vitamin D insufficiency     Chronic problem, stable, check vitamin D level to assess control.         Relevant Orders    VITAMIN D,25 HYDROXY (DEFICIENCY)    Vitamin B deficiency     Chronic problem, stable, continue vitamin B12 supplementation.  Recheck labs.         Relevant Orders    VITAMIN B12    Thyroid nodule     Chronic problem, stable, check labs including TSH, thyroid antibodies.  We will decide further management based on these results         Relevant Orders    TSH WITH REFLEX TO FT4    THYROID PEROXIDASE  (TPO) AB    ANTITHYROGLOBULIN AB    Familial hyperlipidemia     Chronic problem, unstable based on last labs.  Recheck labs to assess control.  Continue Crestor 10 mg daily.         Relevant Orders    Comp Metabolic Panel    Lipid Profile     Other Visit Diagnoses       Screening for diabetes mellitus        Relevant Orders    HEMOGLOBIN A1C    Encounter for screening mammogram for malignant neoplasm of breast        Relevant Orders    MA-SCREENING MAMMO BILAT W/TOMOSYNTHESIS W/CAD    Screening for HIV (human immunodeficiency virus)        Relevant Orders    HIV AG/AB COMBO ASSAY SCREENING             Please note that this dictation was created using voice recognition software. I have made every reasonable attempt to correct obvious errors, but I expect that there are errors of grammar and possibly content that I did not discover before finalizing the note.    Follow up in 3 months for lab follow  up.

## 2023-04-26 ENCOUNTER — TELEPHONE (OUTPATIENT)
Dept: HEALTH INFORMATION MANAGEMENT | Facility: OTHER | Age: 63
End: 2023-04-26
Payer: COMMERCIAL

## 2023-06-05 ENCOUNTER — PATIENT MESSAGE (OUTPATIENT)
Dept: MEDICAL GROUP | Facility: MEDICAL CENTER | Age: 63
End: 2023-06-05
Payer: MEDICARE

## 2023-06-05 DIAGNOSIS — E78.49 FAMILIAL HYPERLIPIDEMIA: ICD-10-CM

## 2023-06-05 RX ORDER — ATORVASTATIN CALCIUM 40 MG/1
40 TABLET, FILM COATED ORAL NIGHTLY
Qty: 90 TABLET | Refills: 3 | Status: SHIPPED | OUTPATIENT
Start: 2023-06-05

## 2023-06-07 ENCOUNTER — HOSPITAL ENCOUNTER (OUTPATIENT)
Facility: MEDICAL CENTER | Age: 63
End: 2023-06-07
Attending: FAMILY MEDICINE
Payer: MEDICARE

## 2023-06-07 ENCOUNTER — OFFICE VISIT (OUTPATIENT)
Dept: MEDICAL GROUP | Facility: MEDICAL CENTER | Age: 63
End: 2023-06-07
Payer: MEDICARE

## 2023-06-07 VITALS
HEART RATE: 67 BPM | RESPIRATION RATE: 16 BRPM | BODY MASS INDEX: 22.96 KG/M2 | DIASTOLIC BLOOD PRESSURE: 80 MMHG | WEIGHT: 134.48 LBS | OXYGEN SATURATION: 97 % | HEIGHT: 64 IN | SYSTOLIC BLOOD PRESSURE: 124 MMHG | TEMPERATURE: 98.1 F

## 2023-06-07 DIAGNOSIS — R30.0 DYSURIA: ICD-10-CM

## 2023-06-07 DIAGNOSIS — F51.02 ADJUSTMENT INSOMNIA: ICD-10-CM

## 2023-06-07 LAB
APPEARANCE UR: CLEAR
BILIRUB UR STRIP-MCNC: NORMAL MG/DL
COLOR UR AUTO: NORMAL
GLUCOSE UR STRIP.AUTO-MCNC: NORMAL MG/DL
KETONES UR STRIP.AUTO-MCNC: NORMAL MG/DL
LEUKOCYTE ESTERASE UR QL STRIP.AUTO: NORMAL
NITRITE UR QL STRIP.AUTO: NORMAL
PH UR STRIP.AUTO: 6 [PH] (ref 5–8)
PROT UR QL STRIP: 100 MG/DL
RBC UR QL AUTO: NORMAL
SP GR UR STRIP.AUTO: 1.02
UROBILINOGEN UR STRIP-MCNC: 0.2 MG/DL

## 2023-06-07 PROCEDURE — 99214 OFFICE O/P EST MOD 30 MIN: CPT | Performed by: FAMILY MEDICINE

## 2023-06-07 PROCEDURE — 3079F DIAST BP 80-89 MM HG: CPT | Performed by: FAMILY MEDICINE

## 2023-06-07 PROCEDURE — 3074F SYST BP LT 130 MM HG: CPT | Performed by: FAMILY MEDICINE

## 2023-06-07 PROCEDURE — 81002 URINALYSIS NONAUTO W/O SCOPE: CPT | Performed by: FAMILY MEDICINE

## 2023-06-07 PROCEDURE — 87086 URINE CULTURE/COLONY COUNT: CPT

## 2023-06-07 RX ORDER — ZOLPIDEM TARTRATE 10 MG/1
10 TABLET ORAL NIGHTLY PRN
Qty: 5 TABLET | Refills: 0 | Status: SHIPPED | OUTPATIENT
Start: 2023-06-07 | End: 2023-06-12

## 2023-06-07 RX ORDER — SULFAMETHOXAZOLE AND TRIMETHOPRIM 800; 160 MG/1; MG/1
1 TABLET ORAL 2 TIMES DAILY
Qty: 10 TABLET | Refills: 0 | Status: SHIPPED | OUTPATIENT
Start: 2023-06-07 | End: 2023-06-12

## 2023-06-07 ASSESSMENT — ENCOUNTER SYMPTOMS
PALPITATIONS: 0
CHILLS: 0
FEVER: 0

## 2023-06-07 NOTE — PROGRESS NOTES
FAMILY MEDICINE VISIT                                                               Chief complaint::Diagnoses of Dysuria and Adjustment insomnia were pertinent to this visit.    History of present illness: URIEL ROMO is a 63 y.o. female who presented for burning urination discomfort, Ambien medication for travel.      She is experiencing burning urination, frequency, urgency and abdominal discomfort.  She has history of UTIs in the past.  She took amoxicillin yesterday.  No blood in urine.      She also requested  few pills of Ambien.  She is traveling to [place] and always has difficulty adjusting.  She is currently not on any controlled prescription      Review of systems:     Review of Systems   Constitutional:  Negative for chills and fever.   Cardiovascular:  Negative for chest pain, palpitations and leg swelling.   Genitourinary:  Positive for dysuria, frequency and urgency.   Psychiatric/Behavioral:          Difficulty adjusting sleep schedule when traveling        Medications and Allergies:     Current Outpatient Medications   Medication Sig Dispense Refill    sulfamethoxazole-trimethoprim (BACTRIM DS) 800-160 MG tablet Take 1 Tablet by mouth 2 times a day for 5 days. 10 Tablet 0    zolpidem (AMBIEN) 10 MG Tab Take 1 Tablet by mouth at bedtime as needed for Sleep for up to 5 days. 5 Tablet 0    atorvastatin (LIPITOR) 40 MG Tab Take 1 Tablet by mouth every evening. 90 Tablet 3    albuterol 108 (90 Base) MCG/ACT Aero Soln inhalation aerosol Inhale 1-2 Puffs every four hours as needed for Shortness of Breath. 1 Each 0    valACYclovir (VALTREX) 500 MG Tab Take 1 Tablet by mouth 2 times a day. Take this medication for episodes of cold sores 40 Tablet 2    fluticasone (FLONASE) 50 MCG/ACT nasal spray Spray 1 Spray in nose 2 times a day. 16 g 0    aspirin (ASA) 325 MG TABS Take 2 Tabs by mouth every day. 100 Tab 3    B Complex Vitamins (B COMPLEX PO) Take  by mouth.      fexofenadine (ALLEGRA) 60 MG  "TABS Take 60 mg by mouth every day.      cyanocobalamin (VITAMIN B-12) 100 MCG TABS Take 100 mcg by mouth every day.       No current facility-administered medications for this visit.          Vitals:    /80   Pulse 67   Temp 36.7 °C (98.1 °F)   Resp 16   Ht 1.626 m (5' 4\")   Wt 61 kg (134 lb 7.7 oz)   SpO2 97%  Body mass index is 23.08 kg/m².    Physical Exam:     Physical Exam  Constitutional:       Appearance: Normal appearance. She is well-developed and well-groomed.   HENT:      Head: Normocephalic and atraumatic.      Right Ear: External ear normal.      Left Ear: External ear normal.   Eyes:      General:         Right eye: No discharge.         Left eye: No discharge.      Conjunctiva/sclera: Conjunctivae normal.   Cardiovascular:      Rate and Rhythm: Normal rate.   Pulmonary:      Effort: Pulmonary effort is normal. No respiratory distress.   Musculoskeletal:      Cervical back: Neck supple.   Skin:     Findings: No rash.   Neurological:      Mental Status: She is alert.   Psychiatric:         Mood and Affect: Mood and affect normal.         Behavior: Behavior normal.              Assessment/Plan:       1. Dysuria  New problem, point-of-care urinalysis showed trace bilirubin, blood, trace ketones.  Send this for urine culture.  Start Bactrim 1 tablet 2 times daily for 5 days.    - POCT Urinalysis  - URINE CULTURE(NEW); Future  - sulfamethoxazole-trimethoprim (BACTRIM DS) 800-160 MG tablet; Take 1 Tablet by mouth 2 times a day for 5 days.  Dispense: 10 Tablet; Refill: 0    2. Adjustment insomnia  New problem, unstable, prescribed Ambien to be used on as-needed basis for travel.  Prescribed 5 tablets.  Controlled substance treatment signed in chart.  Patient is aware that this is a controlled prescription.    - Controlled Substance Treatment Agreement  - zolpidem (AMBIEN) 10 MG Tab; Take 1 Tablet by mouth at bedtime as needed for Sleep for up to 5 days.  Dispense: 5 Tablet; Refill: 0   Please " note that this dictation was created using voice recognition software. I have made every reasonable attempt to correct obvious errors, but I expect that there are errors of grammar and possibly content that I did not discover before finalizing the note.    Follow up in July for blood work follow-up.

## 2023-06-08 DIAGNOSIS — R30.0 DYSURIA: ICD-10-CM

## 2023-06-10 LAB
BACTERIA UR CULT: NORMAL
SIGNIFICANT IND 70042: NORMAL
SITE SITE: NORMAL
SOURCE SOURCE: NORMAL

## 2023-07-13 ENCOUNTER — OFFICE VISIT (OUTPATIENT)
Dept: URGENT CARE | Facility: CLINIC | Age: 63
End: 2023-07-13
Payer: MEDICARE

## 2023-07-13 VITALS
DIASTOLIC BLOOD PRESSURE: 74 MMHG | SYSTOLIC BLOOD PRESSURE: 118 MMHG | RESPIRATION RATE: 14 BRPM | TEMPERATURE: 98.2 F | OXYGEN SATURATION: 98 % | WEIGHT: 133 LBS | BODY MASS INDEX: 22.71 KG/M2 | HEART RATE: 78 BPM | HEIGHT: 64 IN

## 2023-07-13 DIAGNOSIS — B96.89 BACTERIAL SINUSITIS: ICD-10-CM

## 2023-07-13 DIAGNOSIS — J32.9 BACTERIAL SINUSITIS: ICD-10-CM

## 2023-07-13 PROCEDURE — 3074F SYST BP LT 130 MM HG: CPT

## 2023-07-13 PROCEDURE — 99213 OFFICE O/P EST LOW 20 MIN: CPT

## 2023-07-13 PROCEDURE — 3078F DIAST BP <80 MM HG: CPT

## 2023-07-13 RX ORDER — AMOXICILLIN AND CLAVULANATE POTASSIUM 875; 125 MG/1; MG/1
1 TABLET, FILM COATED ORAL 2 TIMES DAILY
Qty: 14 TABLET | Refills: 0 | Status: SHIPPED | OUTPATIENT
Start: 2023-07-13 | End: 2023-07-18 | Stop reason: SDUPTHER

## 2023-07-13 NOTE — PROGRESS NOTES
Subjective:   URIEL ROMO is a 63 y.o. female who presents for Sinusitis (X 7 days, sinus congestion, sinus pain, sinus pressure.)      HPI:    Patient presents to urgent care with concerns of one week of rhinorrhea, nasal congestion, headache, dry cough.    Mild improvement with tylenol, warm showers, nasal rinses, antihistamines, and decongestant.    Reports worsening of symptoms in the past 1-2 days.   Endorses very light dizziness, upper teeth throbbing, chills  Denies wheezing, chest tightness, SOB  No fever.  Tolerating diet.  Denies known sick contacts.     ROS As above in HPI    Medications:    Current Outpatient Medications on File Prior to Visit   Medication Sig Dispense Refill    atorvastatin (LIPITOR) 40 MG Tab Take 1 Tablet by mouth every evening. 90 Tablet 3    albuterol 108 (90 Base) MCG/ACT Aero Soln inhalation aerosol Inhale 1-2 Puffs every four hours as needed for Shortness of Breath. 1 Each 0    valACYclovir (VALTREX) 500 MG Tab Take 1 Tablet by mouth 2 times a day. Take this medication for episodes of cold sores 40 Tablet 2    fluticasone (FLONASE) 50 MCG/ACT nasal spray Spray 1 Spray in nose 2 times a day. 16 g 0    aspirin (ASA) 325 MG TABS Take 2 Tabs by mouth every day. 100 Tab 3    B Complex Vitamins (B COMPLEX PO) Take  by mouth.      fexofenadine (ALLEGRA) 60 MG TABS Take 60 mg by mouth every day.      cyanocobalamin (VITAMIN B-12) 100 MCG TABS Take 100 mcg by mouth every day.       No current facility-administered medications on file prior to visit.        Allergies:   Patient has no known allergies.    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 at SURGERY SAME DAY [facility]S    PTERYGIUM EXCISION  11/17/2010    Performed by STARLA FOWLER at SURGERY SAME DAY [facility] ORS    CYSTOSCOPY  8/6/2010    Performed by FABIOLA VAZ at SURGERY SAME DAY [facility] ORS    VAGINAL HYSTERECTOMY SCOPE TOTAL  8/6/2010    Performed by FABIOLA VAZ at SURGERY SAME DAY [facility] ORS    SEPTOPLASTY  2006    Dr. Jackson    NASAL POLYPECTOMY  2006    Dr. Craig    OTHER ORTHOPEDIC SURGERY  1987    rt knee arthroscopic       Past Social Hx:   Social History     Tobacco Use    Smoking status: Never    Smokeless tobacco: Never   Vaping Use    Vaping Use: Never used   Substance Use Topics    Alcohol use: Yes     Comment: OCC    Drug use: No          Problem list, medications, and allergies reviewed by myself today in Epic.     Objective:     /74   Pulse 78   Temp 36.8 °C (98.2 °F) (Temporal)   Resp 14   Ht 1.626 m (5' 4\")   Wt 60.3 kg (133 lb)   LMP 07/17/2010   SpO2 98%   BMI 22.83 kg/m²     Physical Exam  Vitals and nursing note reviewed.   Constitutional:       General: She is not in acute distress.     Appearance: Normal appearance. She is not ill-appearing or diaphoretic.   HENT:      Head: Normocephalic and atraumatic.      Right Ear: Ear canal normal. A middle ear effusion is present.      Left Ear: Ear canal normal. A middle ear effusion is present.      Nose: Congestion and rhinorrhea present. Rhinorrhea is clear and purulent.      Right Sinus: Maxillary sinus tenderness and frontal sinus tenderness present.      Left Sinus: Maxillary sinus tenderness and frontal sinus tenderness present.      Mouth/Throat:      Mouth: Mucous membranes are moist.      Pharynx: Oropharynx is clear. Uvula midline. Posterior oropharyngeal erythema (Moderate PND) present. No pharyngeal swelling or oropharyngeal exudate.      Tonsils: No tonsillar exudate.   Eyes:      " Conjunctiva/sclera: Conjunctivae normal.   Cardiovascular:      Rate and Rhythm: Normal rate and regular rhythm.      Heart sounds: Normal heart sounds.   Pulmonary:      Effort: Pulmonary effort is normal. No respiratory distress.      Breath sounds: Normal breath sounds and air entry. No stridor. No wheezing, rhonchi or rales.   Chest:      Chest wall: No tenderness.   Abdominal:      General: Bowel sounds are normal.      Palpations: Abdomen is soft.   Musculoskeletal:      Cervical back: No rigidity or tenderness.   Lymphadenopathy:      Cervical: No cervical adenopathy.   Skin:     General: Skin is warm and dry.      Capillary Refill: Capillary refill takes less than 2 seconds.      Findings: No rash.   Neurological:      Mental Status: She is alert and oriented to person, place, and time.         Assessment/Plan:       Diagnosis and associated orders:   1. Bacterial sinusitis  - amoxicillin-clavulanate (AUGMENTIN) 875-125 MG Tab; Take 1 Tablet by mouth 2 times a day for 7 days.  Dispense: 14 Tablet; Refill: 0        Comments/MDM:     Increase clear fluid intake, rest.  Antihistamines, Flonase  Salt water gargles, ice chips to soothe throat, lozenges  Increase emitted use humidifier by bedside.    Exposure to steam for expectoration.  Nasal saline as needed.  Return to  if not improved over the next 2-3 days   Follow-up with primary care advised.         Please note that this dictation was created using voice recognition software. I have made a reasonable attempt to correct obvious errors, but I expect that there are errors of grammar and possibly content that I did not discover before finalizing the note.    This note was electronically signed by Nkechi Adames DNP

## 2023-07-15 ENCOUNTER — OFFICE VISIT (OUTPATIENT)
Dept: URGENT CARE | Facility: CLINIC | Age: 63
End: 2023-07-15
Payer: MEDICARE

## 2023-07-15 VITALS
HEIGHT: 64 IN | WEIGHT: 133 LBS | DIASTOLIC BLOOD PRESSURE: 78 MMHG | SYSTOLIC BLOOD PRESSURE: 112 MMHG | RESPIRATION RATE: 18 BRPM | BODY MASS INDEX: 22.71 KG/M2 | HEART RATE: 82 BPM | OXYGEN SATURATION: 97 % | TEMPERATURE: 99.6 F

## 2023-07-15 DIAGNOSIS — J01.00 ACUTE NON-RECURRENT MAXILLARY SINUSITIS: ICD-10-CM

## 2023-07-15 PROCEDURE — 3074F SYST BP LT 130 MM HG: CPT | Performed by: FAMILY MEDICINE

## 2023-07-15 PROCEDURE — 99214 OFFICE O/P EST MOD 30 MIN: CPT | Performed by: FAMILY MEDICINE

## 2023-07-15 PROCEDURE — 3078F DIAST BP <80 MM HG: CPT | Performed by: FAMILY MEDICINE

## 2023-07-15 RX ORDER — IBUPROFEN 800 MG/1
800 TABLET ORAL EVERY 8 HOURS PRN
Qty: 60 TABLET | Refills: 0 | Status: SHIPPED | OUTPATIENT
Start: 2023-07-15

## 2023-07-15 RX ORDER — FLUTICASONE PROPIONATE 50 MCG
1 SPRAY, SUSPENSION (ML) NASAL 2 TIMES DAILY
Qty: 16 G | Refills: 0 | Status: SHIPPED | OUTPATIENT
Start: 2023-07-15 | End: 2023-07-22

## 2023-07-15 RX ORDER — DOXYCYCLINE HYCLATE 100 MG
100 TABLET ORAL 2 TIMES DAILY
Qty: 14 TABLET | Refills: 0 | Status: SHIPPED | OUTPATIENT
Start: 2023-07-15 | End: 2023-07-15 | Stop reason: CLARIF

## 2023-07-16 NOTE — PROGRESS NOTES
Chief Complaint   Patient presents with    Sinusitis     Was seen on 7/13/23 for Bacterial sinusitis and still not feeling any better antibiotics given not helping.            Cough  This is a new problem. The current episode started 7 days ago. The problem has been gradually worsening. The problem occurs constantly. The cough is productive of green sputum. Associated symptoms include : headaches,  nasal congestion, nasal discharge.    Pt reports more fatigue than usual.     No objective fevers at home.    States cough is making it hard to sleep at night.   Pertinent negatives include no   nausea, vomiting, diarrhea, sweats, weight loss or wheezing. Nothing aggravates the symptoms.  Patient has tried several OTC cold products for the symptoms - no improvement.  pt does not have a hx of frequent sinusitis     Past Medical History:   Diagnosis Date    Anesthesia     nausea    ASTHMA     no meds    Other specified symptom associated with female genital organs     lots of cramping, cyst    Seasonal allergies 10/16/2014    Thyroid disease     Vitamin D insufficiency 10/16/2014         Social History     Tobacco Use    Smoking status: Never    Smokeless tobacco: Never   Vaping Use    Vaping Use: Never used   Substance Use Topics    Alcohol use: Yes     Comment: OCC    Drug use: No         Family history was reviewed and not pertinent           Review of Systems   Constitutional: Negative for fever, chills and weight loss.   HENT - denies  ear pain.   Positive congestion, sore throat  Eyes: denies vision changes, discharge  Respiratory: Negative for hemoptysis and wheezing.    Cardiovascular: Negative for chest pain or PND.   Gastrointestinal:  No abdominal pain,  nausea, vomiting, diarrhea.  Negative for  blood in stool.    - no discharge, dysuria, frequency.      Neurological: Negative for dizziness.   + headaches.   musculoskeletal - denies myalgias, calf pain  Psych - denies anxiety/depression/mood changes.  Skin: no  "itching or rash  All other systems reviewed and are negative.             Objective:     Blood pressure 130/90, pulse 64, temperature 36.2 °C (97.2 °F), resp. rate 16, height 1.651 m (5' 5\"), weight 80.3 kg (177 lb), SpO2 97 %.    Physical Exam   Constitutional: patient is oriented to person, place, and time. Patient appears well-developed and well-nourished. No distress.   HENT:   Head: Normocephalic and atraumatic.   Right Ear: External ear normal.   Left Ear: External ear normal.   TMs both normal  Nose: Mucosal edema  present.   There is tenderness to percussion over left and right sinuses  Mouth/Throat: Mucous membranes are normal. No oral lesions.  There is posterior pharyngeal erythema.  No oropharyngeal exudate or posterior oropharyngeal edema.   Eyes: Conjunctivae and EOM are normal. Pupils are equal, round, and reactive to light. Right eye exhibits no discharge. Left eye exhibits no discharge. No scleral icterus.   Neck: Normal range of motion. Neck supple. No tracheal deviation present.   Cardiovascular: Normal rate, regular rhythm and normal heart sounds.  Exam reveals no friction rub.    Pulmonary/Chest: Effort normal. No respiratory distress. Patient has no wheezes or rhonchi. Patient has no rales.    Musculoskeletal:  exhibits no edema.   Lymphadenopathy:     Patient has no  cervical adenopathy.      Neurological: patient is alert and oriented to person, place, and time.   CN 2-12 intact  Skin: Skin is warm and dry. No rash noted. No erythema.   Psychiatric: patient  has a normal mood and affect.  behavior is normal.   Nursing note and vitals reviewed.              Assessment/Plan:      1. Acute non-recurrent maxillary sinusitis       Currently on day #3 augmentin for sinusitis but she has not improved much yet      I have advised her to have some patience and continue the augmentin for the full 7 day course      I have added motrin and flonase for symptomatic tx.       - ibuprofen (MOTRIN) 800 MG " Tab; Take 1 Tablet by mouth every 8 hours as needed for Moderate Pain.  Dispense: 60 Tablet; Refill: 0  - fluticasone (FLONASE) 50 MCG/ACT nasal spray; Administer 1 Spray into affected nostril(S) 2 times a day for 7 days.  Dispense: 16 g; Refill: 0       Follow up in one week if no improvement, sooner if symptoms worsen.

## 2023-07-18 ENCOUNTER — PATIENT MESSAGE (OUTPATIENT)
Dept: MEDICAL GROUP | Facility: MEDICAL CENTER | Age: 63
End: 2023-07-18
Payer: MEDICARE

## 2023-07-18 DIAGNOSIS — B96.89 BACTERIAL SINUSITIS: ICD-10-CM

## 2023-07-18 DIAGNOSIS — J32.9 BACTERIAL SINUSITIS: ICD-10-CM

## 2023-07-18 RX ORDER — AMOXICILLIN AND CLAVULANATE POTASSIUM 875; 125 MG/1; MG/1
1 TABLET, FILM COATED ORAL 2 TIMES DAILY
Qty: 14 TABLET | Refills: 0 | Status: SHIPPED | OUTPATIENT
Start: 2023-07-18 | End: 2023-07-25

## 2023-07-19 ENCOUNTER — APPOINTMENT (OUTPATIENT)
Dept: MEDICAL GROUP | Facility: MEDICAL CENTER | Age: 63
End: 2023-07-19
Payer: MEDICARE

## 2023-07-25 ENCOUNTER — HOSPITAL ENCOUNTER (OUTPATIENT)
Dept: RADIOLOGY | Facility: MEDICAL CENTER | Age: 63
End: 2023-07-25
Attending: FAMILY MEDICINE
Payer: MEDICARE

## 2023-07-25 DIAGNOSIS — Z12.31 ENCOUNTER FOR SCREENING MAMMOGRAM FOR MALIGNANT NEOPLASM OF BREAST: ICD-10-CM

## 2023-07-25 PROCEDURE — 77063 BREAST TOMOSYNTHESIS BI: CPT

## 2023-10-25 ENCOUNTER — HOSPITAL ENCOUNTER (OUTPATIENT)
Dept: RADIOLOGY | Facility: MEDICAL CENTER | Age: 63
End: 2023-10-25
Attending: FAMILY MEDICINE
Payer: MEDICARE

## 2023-10-25 DIAGNOSIS — Z12.39 ENCOUNTER FOR BREAST CANCER SCREENING USING NON-MAMMOGRAM MODALITY: ICD-10-CM

## 2023-10-25 DIAGNOSIS — R92.30 DENSE BREAST TISSUE ON MAMMOGRAM: ICD-10-CM

## 2023-10-25 PROCEDURE — 76641 ULTRASOUND BREAST COMPLETE: CPT

## 2024-02-12 DIAGNOSIS — B00.1 RECURRENT COLD SORES: ICD-10-CM

## 2024-02-12 RX ORDER — VALACYCLOVIR HYDROCHLORIDE 500 MG/1
TABLET, FILM COATED ORAL
Qty: 60 TABLET | Refills: 1 | Status: SHIPPED | OUTPATIENT
Start: 2024-02-12

## 2024-02-12 NOTE — TELEPHONE ENCOUNTER
Received request via: Pharmacy    Was the patient seen in the last year in this department? Yes    Does the patient have an active prescription (recently filled or refills available) for medication(s) requested? No    Pharmacy Name: [facility]

## 2024-04-09 ENCOUNTER — TELEPHONE (OUTPATIENT)
Dept: HEALTH INFORMATION MANAGEMENT | Facility: OTHER | Age: 64
End: 2024-04-09

## 2024-04-15 ENCOUNTER — HOSPITAL ENCOUNTER (OUTPATIENT)
Dept: LAB | Facility: MEDICAL CENTER | Age: 64
End: 2024-04-15
Attending: FAMILY MEDICINE
Payer: MEDICARE

## 2024-04-15 DIAGNOSIS — Z11.4 SCREENING FOR HIV (HUMAN IMMUNODEFICIENCY VIRUS): ICD-10-CM

## 2024-04-15 DIAGNOSIS — E78.49 FAMILIAL HYPERLIPIDEMIA: ICD-10-CM

## 2024-04-15 DIAGNOSIS — E53.9 VITAMIN B DEFICIENCY: ICD-10-CM

## 2024-04-15 DIAGNOSIS — Z13.1 SCREENING FOR DIABETES MELLITUS: ICD-10-CM

## 2024-04-15 DIAGNOSIS — E55.9 VITAMIN D INSUFFICIENCY: ICD-10-CM

## 2024-04-15 DIAGNOSIS — E04.1 THYROID NODULE: ICD-10-CM

## 2024-04-15 LAB
25(OH)D3 SERPL-MCNC: 42 NG/ML (ref 30–100)
ALBUMIN SERPL BCP-MCNC: 4.3 G/DL (ref 3.2–4.9)
ALBUMIN/GLOB SERPL: 1.8 G/DL
ALP SERPL-CCNC: 83 U/L (ref 30–99)
ALT SERPL-CCNC: 34 U/L (ref 2–50)
ANION GAP SERPL CALC-SCNC: 13 MMOL/L (ref 7–16)
AST SERPL-CCNC: 32 U/L (ref 12–45)
BILIRUB SERPL-MCNC: 0.4 MG/DL (ref 0.1–1.5)
BUN SERPL-MCNC: 13 MG/DL (ref 8–22)
CALCIUM ALBUM COR SERPL-MCNC: 8.9 MG/DL (ref 8.5–10.5)
CALCIUM SERPL-MCNC: 9.1 MG/DL (ref 8.5–10.5)
CHLORIDE SERPL-SCNC: 106 MMOL/L (ref 96–112)
CHOLEST SERPL-MCNC: 211 MG/DL (ref 100–199)
CO2 SERPL-SCNC: 24 MMOL/L (ref 20–33)
CREAT SERPL-MCNC: 0.57 MG/DL (ref 0.5–1.4)
EST. AVERAGE GLUCOSE BLD GHB EST-MCNC: 97 MG/DL
GFR SERPLBLD CREATININE-BSD FMLA CKD-EPI: 100 ML/MIN/1.73 M 2
GLOBULIN SER CALC-MCNC: 2.4 G/DL (ref 1.9–3.5)
GLUCOSE SERPL-MCNC: 91 MG/DL (ref 65–99)
HBA1C MFR BLD: 5 % (ref 4–5.6)
HDLC SERPL-MCNC: 86 MG/DL
HIV 1+2 AB+HIV1 P24 AG SERPL QL IA: NORMAL
LDLC SERPL CALC-MCNC: 115 MG/DL
POTASSIUM SERPL-SCNC: 4.5 MMOL/L (ref 3.6–5.5)
PROT SERPL-MCNC: 6.7 G/DL (ref 6–8.2)
SODIUM SERPL-SCNC: 143 MMOL/L (ref 135–145)
THYROPEROXIDASE AB SERPL-ACNC: 14 IU/ML (ref 0–9)
TRIGL SERPL-MCNC: 49 MG/DL (ref 0–149)
TSH SERPL DL<=0.005 MIU/L-ACNC: 3.95 UIU/ML (ref 0.38–5.33)
VIT B12 SERPL-MCNC: 2701 PG/ML (ref 211–911)

## 2024-04-15 PROCEDURE — 80061 LIPID PANEL: CPT

## 2024-04-15 PROCEDURE — 82306 VITAMIN D 25 HYDROXY: CPT

## 2024-04-15 PROCEDURE — 87389 HIV-1 AG W/HIV-1&-2 AB AG IA: CPT

## 2024-04-15 PROCEDURE — 82607 VITAMIN B-12: CPT

## 2024-04-15 PROCEDURE — 83036 HEMOGLOBIN GLYCOSYLATED A1C: CPT

## 2024-04-15 PROCEDURE — 36415 COLL VENOUS BLD VENIPUNCTURE: CPT

## 2024-04-15 PROCEDURE — 80053 COMPREHEN METABOLIC PANEL: CPT

## 2024-04-15 PROCEDURE — 86376 MICROSOMAL ANTIBODY EACH: CPT

## 2024-04-15 PROCEDURE — 86800 THYROGLOBULIN ANTIBODY: CPT

## 2024-04-15 PROCEDURE — 84443 ASSAY THYROID STIM HORMONE: CPT

## 2024-04-17 ENCOUNTER — APPOINTMENT (OUTPATIENT)
Dept: MEDICAL GROUP | Facility: MEDICAL CENTER | Age: 64
End: 2024-04-17
Payer: MEDICARE

## 2024-04-17 LAB — THYROGLOB AB SERPL-ACNC: <0.9 IU/ML (ref 0–4)

## 2024-04-24 ENCOUNTER — OFFICE VISIT (OUTPATIENT)
Dept: MEDICAL GROUP | Facility: MEDICAL CENTER | Age: 64
End: 2024-04-24
Payer: MEDICARE

## 2024-04-24 VITALS
RESPIRATION RATE: 16 BRPM | OXYGEN SATURATION: 98 % | DIASTOLIC BLOOD PRESSURE: 62 MMHG | HEART RATE: 79 BPM | BODY MASS INDEX: 21.85 KG/M2 | SYSTOLIC BLOOD PRESSURE: 118 MMHG | HEIGHT: 64 IN | WEIGHT: 128 LBS | TEMPERATURE: 97.6 F

## 2024-04-24 DIAGNOSIS — Z12.83 SCREENING FOR SKIN CANCER: ICD-10-CM

## 2024-04-24 DIAGNOSIS — E06.9 THYROIDITIS: ICD-10-CM

## 2024-04-24 DIAGNOSIS — E78.49 FAMILIAL HYPERLIPIDEMIA: ICD-10-CM

## 2024-04-24 PROCEDURE — 3074F SYST BP LT 130 MM HG: CPT | Performed by: FAMILY MEDICINE

## 2024-04-24 PROCEDURE — 3078F DIAST BP <80 MM HG: CPT | Performed by: FAMILY MEDICINE

## 2024-04-24 PROCEDURE — 99214 OFFICE O/P EST MOD 30 MIN: CPT | Performed by: FAMILY MEDICINE

## 2024-04-24 ASSESSMENT — ENCOUNTER SYMPTOMS
CHILLS: 0
PALPITATIONS: 0
FEVER: 0

## 2024-04-24 ASSESSMENT — PATIENT HEALTH QUESTIONNAIRE - PHQ9: CLINICAL INTERPRETATION OF PHQ2 SCORE: 0

## 2024-04-24 NOTE — PROGRESS NOTES
Verbal consent was acquired by the patient to use [employer] ambient listening note generation during this visit.      Diagnoses and all orders for this visit:    Thyroiditis  -     TSH+FREE T4    Familial hyperlipidemia  -     Lipid Profile; Future    Screening for skin cancer  -     Referral to Dermatology                  Assessment & Plan  1.  Hashimoto's thyroiditis.  The patient's condition is chronic and currently stable. Her TSH levels have consistently been normal, and she is not currently on any medication. Her last thyroid ultrasound, conducted in 2022, indicated a stable thyroid nodule, which does not necessitate a follow-up. Recent TPO antibodies have shown a mildly elevated level of 14. A recheck of the thyroid function test will be conducted in 6 months.    2. Hypercholesterolemia.  The patient's condition remains chronic and unstable, albeit significantly improved from previous readings. The patient will maintain her current regimen of atorvastatin 40 mg daily. A recheck of the labs will be conducted in 6 months.    3. Due vaccines.  We discussed about vaccines and the patient declined vaccines during this visit.    Follow-up  The patient is scheduled for a follow-up visit in 6 months for a blood test follow-up.          Chief complaint::Diagnoses of Thyroiditis, Familial hyperlipidemia, and Screening for skin cancer were pertinent to this visit.      History of Present Illness  The patient is a 63-year-old female who is here for blood test follow-up. Blood test was done on 04/15/2024.    The patient reports a general sense of well-being, with adequate sleep and no episodes of hypotension or cognitive impairment. She has a history of a thyroid nodule, which was surgically excised.    The patient admits to adherence to her atorvastatin regimen, taking it only in the morning. She acknowledges the need to adhere to dietary modifications and regular exercise.    The patient expresses a desire to consult  "with a dermatologist, citing extensive sun exposure due to her 's history of skin cancer. She acknowledges that it has been several years since her last comprehensive skin examination.    The patient queries the necessity of regular gynecological consultations.      Review of Systems   Constitutional:  Negative for chills and fever.   Cardiovascular:  Negative for chest pain, palpitations and leg swelling.          Medications and Allergies:     Current Outpatient Medications   Medication Sig Dispense Refill    valACYclovir (VALTREX) 500 MG Tab Take 2 tablets by mouth for 5 days for acute episode of cold sores 60 Tablet 1    ibuprofen (MOTRIN) 800 MG Tab Take 1 Tablet by mouth every 8 hours as needed for Moderate Pain. 60 Tablet 0    atorvastatin (LIPITOR) 40 MG Tab Take 1 Tablet by mouth every evening. 90 Tablet 3    albuterol 108 (90 Base) MCG/ACT Aero Soln inhalation aerosol Inhale 1-2 Puffs every four hours as needed for Shortness of Breath. 1 Each 0    fluticasone (FLONASE) 50 MCG/ACT nasal spray Spray 1 Spray in nose 2 times a day. 16 g 0    aspirin (ASA) 325 MG TABS Take 2 Tabs by mouth every day. 100 Tab 3    B Complex Vitamins (B COMPLEX PO) Take  by mouth.      fexofenadine (ALLEGRA) 60 MG TABS Take 60 mg by mouth every day.      cyanocobalamin (VITAMIN B-12) 100 MCG TABS Take 100 mcg by mouth every day.       No current facility-administered medications for this visit.       /62   Pulse 79   Temp 36.4 °C (97.6 °F)   Resp 16   Ht 1.626 m (5' 4\")   Wt 58.1 kg (128 lb)   SpO2 98% , Body mass index is 21.97 kg/m².      Physical Exam  Constitutional:       Appearance: Normal appearance. She is well-developed and well-groomed.   HENT:      Head: Normocephalic and atraumatic.      Right Ear: External ear normal.      Left Ear: External ear normal.   Eyes:      General:         Right eye: No discharge.         Left eye: No discharge.      Conjunctiva/sclera: Conjunctivae normal. "   Cardiovascular:      Rate and Rhythm: Normal rate.   Pulmonary:      Effort: Pulmonary effort is normal. No respiratory distress.   Musculoskeletal:      Cervical back: Neck supple.   Skin:     Findings: No rash.   Neurological:      Mental Status: She is alert.   Psychiatric:         Mood and Affect: Mood and affect normal.         Behavior: Behavior normal.              Results  Laboratory Studies from 4/15/2024 discussed with patient.            Please note that this dictation was created using voice recognition software. I have made every reasonable attempt to correct obvious errors, but I expect that there are errors of grammar and possibly content that I did not discover before finalizing the note.

## 2024-05-01 ENCOUNTER — TELEPHONE (OUTPATIENT)
Dept: HEALTH INFORMATION MANAGEMENT | Facility: OTHER | Age: 64
End: 2024-05-01
Payer: MEDICARE

## 2024-05-01 DIAGNOSIS — E06.9 THYROIDITIS: ICD-10-CM

## 2024-05-01 DIAGNOSIS — E04.1 THYROID NODULE: ICD-10-CM

## 2024-05-01 NOTE — TELEPHONE ENCOUNTER
1. Caller Name: Sandra Romo                          Call Back Number: [phone]      How would the patient prefer to be contacted with a response: MyChart message      2. SPECIFIC Action To Be Taken: Referral pending, please sign.    3. Diagnosis/Clinical Reason for Request: Thyroid    4. Specialty & Provider Name/Lab/Imaging Location:   Endocrinology  SARAH Bustamante      5. Is appointment scheduled for requested order/referral: no    Patient was informed they will receive a return phone call from the office ONLY if there are any questions before processing their request. Advised to call back if they haven't received a call from the referral department in 5 days.      MyChart Message from Patient:     SANDRA ROMO would like to request a referral.  Reason: Endorin for my Thyroid  Requested provider: SARAH Bustamante  Comment:  I think it's time I get into an endocrinologist.  Please fax the referral to Dr. Bustamante's office at [phone].  Thank you.      
Never smoker

## 2024-06-24 DIAGNOSIS — E78.49 FAMILIAL HYPERLIPIDEMIA: ICD-10-CM

## 2024-06-24 RX ORDER — ATORVASTATIN CALCIUM 40 MG/1
40 TABLET, FILM COATED ORAL EVERY EVENING
Qty: 100 TABLET | Refills: 3 | Status: SHIPPED | OUTPATIENT
Start: 2024-06-24

## 2024-06-24 NOTE — TELEPHONE ENCOUNTER
Received request via: Pharmacy    Was the patient seen in the last year in this department? Yes    Does the patient have an active prescription (recently filled or refills available) for medication(s) requested? No    [facility]    Does the patient have longterm Plus and need 100 day supply (blood pressure, diabetes and cholesterol meds only)? Yes, quantity updated to 100 days and Medication is not for cholesterol, blood pressure or diabetes

## 2024-06-26 ENCOUNTER — APPOINTMENT (RX ONLY)
Dept: URBAN - METROPOLITAN AREA CLINIC 15 | Facility: CLINIC | Age: 64
Setting detail: DERMATOLOGY
End: 2024-06-26

## 2024-06-26 DIAGNOSIS — Z71.89 OTHER SPECIFIED COUNSELING: ICD-10-CM

## 2024-06-26 DIAGNOSIS — L82.1 OTHER SEBORRHEIC KERATOSIS: ICD-10-CM

## 2024-06-26 DIAGNOSIS — D18.0 HEMANGIOMA: ICD-10-CM

## 2024-06-26 DIAGNOSIS — L81.4 OTHER MELANIN HYPERPIGMENTATION: ICD-10-CM

## 2024-06-26 DIAGNOSIS — D22 MELANOCYTIC NEVI: ICD-10-CM

## 2024-06-26 PROBLEM — D18.01 HEMANGIOMA OF SKIN AND SUBCUTANEOUS TISSUE: Status: ACTIVE | Noted: 2024-06-26

## 2024-06-26 PROBLEM — D22.71 MELANOCYTIC NEVI OF RIGHT LOWER LIMB, INCLUDING HIP: Status: ACTIVE | Noted: 2024-06-26

## 2024-06-26 PROCEDURE — ? COUNSELING

## 2024-06-26 PROCEDURE — 99203 OFFICE O/P NEW LOW 30 MIN: CPT

## 2024-06-26 PROCEDURE — ? REFERRAL CORRESPONDENCE

## 2024-06-26 PROCEDURE — ? SUNSCREEN RECOMMENDATIONS

## 2024-06-26 ASSESSMENT — LOCATION SIMPLE DESCRIPTION DERM
LOCATION SIMPLE: RIGHT CALF
LOCATION SIMPLE: LEFT FOREARM
LOCATION SIMPLE: LEFT UPPER ARM
LOCATION SIMPLE: CHEST
LOCATION SIMPLE: RIGHT SCALP

## 2024-06-26 ASSESSMENT — LOCATION DETAILED DESCRIPTION DERM
LOCATION DETAILED: LEFT PROXIMAL DORSAL FOREARM
LOCATION DETAILED: RIGHT PROXIMAL CALF
LOCATION DETAILED: LEFT ANTERIOR DISTAL UPPER ARM
LOCATION DETAILED: RIGHT CENTRAL FRONTAL SCALP
LOCATION DETAILED: RIGHT MEDIAL INFERIOR CHEST

## 2024-06-26 ASSESSMENT — LOCATION ZONE DERM
LOCATION ZONE: TRUNK
LOCATION ZONE: SCALP
LOCATION ZONE: LEG
LOCATION ZONE: ARM

## 2024-07-22 ENCOUNTER — PATIENT MESSAGE (OUTPATIENT)
Dept: MEDICAL GROUP | Facility: MEDICAL CENTER | Age: 64
End: 2024-07-22
Payer: MEDICARE

## 2024-07-22 DIAGNOSIS — Z79.890 HORMONE REPLACEMENT THERAPY (HRT): ICD-10-CM

## 2024-07-29 ENCOUNTER — HOSPITAL ENCOUNTER (OUTPATIENT)
Dept: LAB | Facility: MEDICAL CENTER | Age: 64
End: 2024-07-29
Attending: INTERNAL MEDICINE
Payer: MEDICARE

## 2024-07-29 LAB
25(OH)D3 SERPL-MCNC: 48 NG/ML (ref 30–100)
T3FREE SERPL-MCNC: 2.28 PG/ML (ref 2–4.4)
T4 FREE SERPL-MCNC: 0.99 NG/DL (ref 0.93–1.7)
THYROPEROXIDASE AB SERPL-ACNC: 14 IU/ML (ref 0–9)
TSH SERPL-ACNC: 2.46 UIU/ML (ref 0.35–5.5)
VIT B12 SERPL-MCNC: 2867 PG/ML (ref 211–911)

## 2024-07-29 PROCEDURE — 82306 VITAMIN D 25 HYDROXY: CPT

## 2024-07-29 PROCEDURE — 36415 COLL VENOUS BLD VENIPUNCTURE: CPT

## 2024-07-29 PROCEDURE — 86376 MICROSOMAL ANTIBODY EACH: CPT

## 2024-07-29 PROCEDURE — 82607 VITAMIN B-12: CPT

## 2024-07-29 PROCEDURE — 84439 ASSAY OF FREE THYROXINE: CPT

## 2024-07-29 PROCEDURE — 84443 ASSAY THYROID STIM HORMONE: CPT

## 2024-07-29 PROCEDURE — 84481 FREE ASSAY (FT-3): CPT

## 2024-09-10 ENCOUNTER — HOSPITAL ENCOUNTER (OUTPATIENT)
Dept: RADIOLOGY | Facility: MEDICAL CENTER | Age: 64
End: 2024-09-10
Attending: INTERNAL MEDICINE
Payer: MEDICARE

## 2024-09-10 DIAGNOSIS — E04.1 NONTOXIC UNINODULAR GOITER: ICD-10-CM

## 2024-09-10 PROCEDURE — 76536 US EXAM OF HEAD AND NECK: CPT

## 2024-10-30 ENCOUNTER — OFFICE VISIT (OUTPATIENT)
Dept: MEDICAL GROUP | Facility: MEDICAL CENTER | Age: 64
End: 2024-10-30
Payer: MEDICARE

## 2024-10-30 VITALS
TEMPERATURE: 97 F | HEIGHT: 64 IN | BODY MASS INDEX: 21.85 KG/M2 | WEIGHT: 128 LBS | OXYGEN SATURATION: 98 % | HEART RATE: 69 BPM | DIASTOLIC BLOOD PRESSURE: 80 MMHG | SYSTOLIC BLOOD PRESSURE: 132 MMHG

## 2024-10-30 DIAGNOSIS — E78.49 FAMILIAL HYPERLIPIDEMIA: ICD-10-CM

## 2024-10-30 DIAGNOSIS — Z78.0 ENCOUNTER FOR OSTEOPOROSIS SCREENING IN ASYMPTOMATIC POSTMENOPAUSAL PATIENT: ICD-10-CM

## 2024-10-30 DIAGNOSIS — R93.1 ELEVATED CORONARY ARTERY CALCIUM SCORE: ICD-10-CM

## 2024-10-30 DIAGNOSIS — M79.10 MUSCLE PAIN: ICD-10-CM

## 2024-10-30 DIAGNOSIS — Z13.820 ENCOUNTER FOR OSTEOPOROSIS SCREENING IN ASYMPTOMATIC POSTMENOPAUSAL PATIENT: ICD-10-CM

## 2024-10-30 RX ORDER — ATORVASTATIN CALCIUM 20 MG/1
20 TABLET, FILM COATED ORAL NIGHTLY
Qty: 100 TABLET | Refills: 3 | Status: SHIPPED | OUTPATIENT
Start: 2024-10-30

## 2024-10-30 ASSESSMENT — ENCOUNTER SYMPTOMS
CHILLS: 0
FEVER: 0
MYALGIAS: 1

## 2024-11-20 ENCOUNTER — TELEPHONE (OUTPATIENT)
Dept: HEALTH INFORMATION MANAGEMENT | Facility: OTHER | Age: 64
End: 2024-11-20
Payer: MEDICARE

## 2024-11-25 ENCOUNTER — OFFICE VISIT (OUTPATIENT)
Dept: URGENT CARE | Facility: CLINIC | Age: 64
End: 2024-11-25
Payer: MEDICARE

## 2024-11-25 VITALS
DIASTOLIC BLOOD PRESSURE: 80 MMHG | HEIGHT: 64 IN | OXYGEN SATURATION: 99 % | SYSTOLIC BLOOD PRESSURE: 124 MMHG | WEIGHT: 158 LBS | HEART RATE: 78 BPM | TEMPERATURE: 97.9 F | BODY MASS INDEX: 26.98 KG/M2 | RESPIRATION RATE: 20 BRPM

## 2024-11-25 DIAGNOSIS — R42 DIZZINESS: ICD-10-CM

## 2024-11-25 LAB — GLUCOSE BLD-MCNC: 78 MG/DL (ref 65–99)

## 2024-11-25 PROCEDURE — 3074F SYST BP LT 130 MM HG: CPT | Performed by: PHYSICIAN ASSISTANT

## 2024-11-25 PROCEDURE — 3079F DIAST BP 80-89 MM HG: CPT | Performed by: PHYSICIAN ASSISTANT

## 2024-11-25 PROCEDURE — 82962 GLUCOSE BLOOD TEST: CPT | Performed by: PHYSICIAN ASSISTANT

## 2024-11-25 PROCEDURE — 99213 OFFICE O/P EST LOW 20 MIN: CPT | Performed by: PHYSICIAN ASSISTANT

## 2024-11-25 RX ORDER — MECLIZINE HYDROCHLORIDE 25 MG/1
25 TABLET ORAL 3 TIMES DAILY PRN
Qty: 30 TABLET | Refills: 0 | Status: SHIPPED | OUTPATIENT
Start: 2024-11-25

## 2024-11-25 ASSESSMENT — ENCOUNTER SYMPTOMS
PHOTOPHOBIA: 0
DIZZINESS: 1
SHORTNESS OF BREATH: 0
PALPITATIONS: 0
FEVER: 0
VOMITING: 0
HEADACHES: 0
CHILLS: 0
BLURRED VISION: 0
NAUSEA: 1
DOUBLE VISION: 0

## 2024-11-25 NOTE — PROGRESS NOTES
Subjective     Sandra Franco is a 64 y.o. female who presents with Dizziness (2 days)    HPI:  Sandra Franco is a 64 y.o. female who presents today for evaluation of dizziness.  She has had dizziness for the past 2 to 3 days.  Says that she noticed it Saturday.  It has not been keeping her from activities.  She still golfed yesterday.  She says that it just comes on randomly from time to time and occasionally makes her nauseous as well.  She has no associated visual changes, focal weakness, chest pain, shortness of breath.  No head injury.  No recent URI symptoms.        Review of Systems   Constitutional:  Negative for chills and fever.   Eyes:  Negative for blurred vision, double vision and photophobia.   Respiratory:  Negative for shortness of breath.    Cardiovascular:  Negative for chest pain and palpitations.   Gastrointestinal:  Positive for nausea. Negative for vomiting.   Genitourinary:  Negative for dysuria.   Neurological:  Positive for dizziness. Negative for headaches.           PMH:  has a past medical history of Anesthesia, ASTHMA, Other specified symptom associated with female genital organs, Seasonal allergies (10/16/2014), Thyroid disease, and Vitamin D insufficiency (10/16/2014).    She has no past medical history of Breast cancer (HCC).  MEDS:   Current Outpatient Medications:     meclizine (ANTIVERT) 25 MG Tab, Take 1 Tablet by mouth 3 times a day as needed for Dizziness or Vertigo., Disp: 30 Tablet, Rfl: 0    atorvastatin (LIPITOR) 20 MG Tab, Take 1 Tablet by mouth every evening. (Patient not taking: Reported on 11/25/2024), Disp: 100 Tablet, Rfl: 3  ALLERGIES: No Known Allergies  SURGHX:   Past Surgical History:   Procedure Laterality Date    THYROIDECTOMY  4/16/2015    Performed by John Valente M.D. at SURGERY SAME DAY [facility] ORS    PTERYGIUM EXCISION  11/17/2010    Performed by STARLA FOWLER at SURGERY SAME DAY [facility]    CYSTOSCOPY  8/6/2010    Performed by  "FABIOLA VAZ at SURGERY SAME DAY [facility] ORS    VAGINAL HYSTERECTOMY SCOPE TOTAL  8/6/2010    Performed by FABIOLA VAZ at SURGERY SAME DAY [facility] ORS    SEPTOPLASTY  2006    Dr. Jackson    NASAL POLYPECTOMY  2006    Dr. Craig    OTHER ORTHOPEDIC SURGERY  1987    rt knee arthroscopic     SOCHX:  reports that she has never smoked. She has never used smokeless tobacco. She reports current alcohol use. She reports that she does not use drugs.  FH: Family history was reviewed, no pertinent findings to report        Objective     /80   Pulse 78   Temp 36.6 °C (97.9 °F) (Temporal)   Resp 20   Ht 1.626 m (5' 4\")   Wt 71.7 kg (158 lb)   LMP 07/17/2010   SpO2 99%   BMI 27.12 kg/m²      Physical Exam  Constitutional:       Appearance: She is well-developed.   HENT:      Head: Normocephalic and atraumatic.      Right Ear: External ear normal.      Left Ear: External ear normal.   Eyes:      Extraocular Movements: Extraocular movements intact.      Conjunctiva/sclera: Conjunctivae normal.      Pupils: Pupils are equal, round, and reactive to light.   Cardiovascular:      Rate and Rhythm: Normal rate and regular rhythm.      Heart sounds: Normal heart sounds. No murmur heard.  Pulmonary:      Effort: Pulmonary effort is normal.      Breath sounds: Normal breath sounds. No wheezing.   Musculoskeletal:      Cervical back: Normal range of motion.   Lymphadenopathy:      Cervical: No cervical adenopathy.   Skin:     General: Skin is warm and dry.      Capillary Refill: Capillary refill takes less than 2 seconds.   Neurological:      General: No focal deficit present.      Mental Status: She is alert and oriented to person, place, and time.      GCS: GCS eye subscore is 4. GCS verbal subscore is 5. GCS motor subscore is 6.      Cranial Nerves: Cranial nerves 2-12 are intact.   Psychiatric:         Behavior: Behavior normal.         Judgment: Judgment normal.       POCT Glucose - 78    Assessment & Plan "     1. Dizziness  - POCT Glucose  - meclizine (ANTIVERT) 25 MG Tab; Take 1 Tablet by mouth 3 times a day as needed for Dizziness or Vertigo.  Dispense: 30 Tablet; Refill: 0  POCT glucose is 78. no concerning red flag symptoms or abnormal neurofindings on today's exam.  2000 patient symptoms are most likely secondary to BPPV.  Will prescribe meclizine.  Discussed that this is typically self-limiting.  If no improvement after another week or if symptoms significantly worsen she should return for reevaluation or present to the emergency department for higher level of care if needed.        Differential Diagnosis, natural history, and supportive care discussed. Return to the Urgent Care or follow up with your PCP if symptoms fail to resolve, or for any new or worsening symptoms. Emergency room precautions discussed. Patient and/or family appears understanding of information.

## 2024-12-11 ENCOUNTER — TELEPHONE (OUTPATIENT)
Dept: HEALTH INFORMATION MANAGEMENT | Facility: OTHER | Age: 64
End: 2024-12-11
Payer: MEDICARE

## 2025-01-08 ENCOUNTER — HOSPITAL ENCOUNTER (OUTPATIENT)
Dept: RADIOLOGY | Facility: MEDICAL CENTER | Age: 65
End: 2025-01-08
Attending: FAMILY MEDICINE
Payer: COMMERCIAL

## 2025-01-08 ENCOUNTER — HOSPITAL ENCOUNTER (OUTPATIENT)
Dept: RADIOLOGY | Facility: MEDICAL CENTER | Age: 65
End: 2025-01-08
Attending: FAMILY MEDICINE
Payer: MEDICARE

## 2025-01-08 DIAGNOSIS — Z13.820 ENCOUNTER FOR OSTEOPOROSIS SCREENING IN ASYMPTOMATIC POSTMENOPAUSAL PATIENT: ICD-10-CM

## 2025-01-08 DIAGNOSIS — R93.1 ELEVATED CORONARY ARTERY CALCIUM SCORE: ICD-10-CM

## 2025-01-08 DIAGNOSIS — Z78.0 ENCOUNTER FOR OSTEOPOROSIS SCREENING IN ASYMPTOMATIC POSTMENOPAUSAL PATIENT: ICD-10-CM

## 2025-01-08 PROCEDURE — 77080 DXA BONE DENSITY AXIAL: CPT

## 2025-01-08 PROCEDURE — 4410556 CT-CARDIAC SCORING (SELF PAY ONLY)

## 2025-01-28 NOTE — ASSESSMENT & PLAN NOTE
Chronic health problem, stable, no further monitoring for nodules recommended.   [de-identified] : 68 y.o male with PMH significant for T2DM, HTN, GERD, hypercholesterolemia, male erectile disorder, vit d deficiency, vitamin b12 deficiency, presenting for f/u on ch issues  retired 7/1/24  needs meds refilled Januvia ozempic and Insulin -copay high -insurance changed  has not been taking ozempic 4 months now   feels much better since starting Ozempic - leg pain / numbness etc resolved - has not take medication ozempic 1 month -pharmacy back ordered   DM AIC - started Ozempic q weekly 0.25 since- 4/2023 increased dose 0.5 weekly 6/2023--> on 2 mg q weekly    - denies nausea or stomach discomfort  -FBS- -112 -post prandial - < 132 -160-165 last 5 weeks > 200  - Semglee 15  am and 10 pm  -stopped Janumet 50/1000 qd 12/2020 due to Cr 1.8 - started on Januvia 50 qd and Jardiance 25 mg  -on crestor 20 qd  - Checks feet every morning, has not noticed any changes. Reports being very busy with tax season.  - saw opthalmology: No diabetic retinopathy detected-4/14/2021 will be getting Lazer sx rt eye 7/20/21 , left 7/27/21 saw ophtho 6/2021 - then 9/8/2021 - saw ophtho 5/2022   Trying to minimize carbs, however still eating rice twice/week. All other days vegetables.    CKD - followed by nephro  - - he gave new rx for amlodipine / valsartan and HCTZ combo - but the pharmacy did not carry it explanation was - they stopped making it - still on old medications  -BP now better , nifedipine 30 daily 12/2020  - home readings- 130-135- 132/ 72-78 30% time -- 70 % 142/80- 146/84  -needs better BP , Sugar And chol control   Hypertension -  - did stress test 9/2018 reviewed with pt neg ECHO nl  -on ramipril and HCTZ , nifedipine 30 qd  --compliant with medications and diet  HTn - better since increasing Nifedipaine to 60-- 120- 130 / 70 at home does at 9 am   GERD- much better s/p egd 2014, no discomfort , doing dietary modifications. saw GI did EGD results good, did colonoscope 2014 wnl   Hyperlipidemia- watching diet, low in fat on simvastatin. Denies any muscle pain  vitamin B 12 deficiency - taking Po supplements ,

## 2025-02-28 ENCOUNTER — APPOINTMENT (OUTPATIENT)
Dept: MEDICAL GROUP | Facility: MEDICAL CENTER | Age: 65
End: 2025-02-28
Payer: MEDICARE

## 2025-03-18 ENCOUNTER — TELEPHONE (OUTPATIENT)
Dept: CARDIOLOGY | Facility: MEDICAL CENTER | Age: 65
End: 2025-03-18
Payer: MEDICARE

## 2025-03-18 NOTE — TELEPHONE ENCOUNTER
Spoke to patient regarding NP appointment, she wishes to cancel appointment as she will be out of town.

## 2025-03-26 ENCOUNTER — TELEPHONE (OUTPATIENT)
Dept: CARDIOLOGY | Facility: MEDICAL CENTER | Age: 65
End: 2025-03-26
Payer: MEDICARE

## 2025-03-26 NOTE — TELEPHONE ENCOUNTER
Called patient to go over cardiac history she stated she cancelled the appt on the phone with someone as she is currently traveling. Patient would like a call back to reschedule

## 2025-03-27 ENCOUNTER — APPOINTMENT (OUTPATIENT)
Dept: CARDIOLOGY | Facility: MEDICAL CENTER | Age: 65
End: 2025-03-27
Attending: FAMILY MEDICINE
Payer: MEDICARE

## 2025-04-22 SDOH — ECONOMIC STABILITY: INCOME INSECURITY: IN THE LAST 12 MONTHS, WAS THERE A TIME WHEN YOU WERE NOT ABLE TO PAY THE MORTGAGE OR RENT ON TIME?: NO

## 2025-04-22 SDOH — ECONOMIC STABILITY: FOOD INSECURITY: WITHIN THE PAST 12 MONTHS, YOU WORRIED THAT YOUR FOOD WOULD RUN OUT BEFORE YOU GOT MONEY TO BUY MORE.: NEVER TRUE

## 2025-04-22 SDOH — HEALTH STABILITY: PHYSICAL HEALTH: ON AVERAGE, HOW MANY MINUTES DO YOU ENGAGE IN EXERCISE AT THIS LEVEL?: 150+ MIN

## 2025-04-22 SDOH — ECONOMIC STABILITY: TRANSPORTATION INSECURITY
IN THE PAST 12 MONTHS, HAS THE LACK OF TRANSPORTATION KEPT YOU FROM MEDICAL APPOINTMENTS OR FROM GETTING MEDICATIONS?: NO

## 2025-04-22 SDOH — HEALTH STABILITY: PHYSICAL HEALTH: ON AVERAGE, HOW MANY DAYS PER WEEK DO YOU ENGAGE IN MODERATE TO STRENUOUS EXERCISE (LIKE A BRISK WALK)?: 5 DAYS

## 2025-04-22 SDOH — ECONOMIC STABILITY: TRANSPORTATION INSECURITY
IN THE PAST 12 MONTHS, HAS LACK OF RELIABLE TRANSPORTATION KEPT YOU FROM MEDICAL APPOINTMENTS, MEETINGS, WORK OR FROM GETTING THINGS NEEDED FOR DAILY LIVING?: NO

## 2025-04-22 SDOH — ECONOMIC STABILITY: FOOD INSECURITY: WITHIN THE PAST 12 MONTHS, THE FOOD YOU BOUGHT JUST DIDN'T LAST AND YOU DIDN'T HAVE MONEY TO GET MORE.: NEVER TRUE

## 2025-04-22 SDOH — HEALTH STABILITY: MENTAL HEALTH
STRESS IS WHEN SOMEONE FEELS TENSE, NERVOUS, ANXIOUS, OR CAN'T SLEEP AT NIGHT BECAUSE THEIR MIND IS TROUBLED. HOW STRESSED ARE YOU?: NOT AT ALL

## 2025-04-22 SDOH — ECONOMIC STABILITY: INCOME INSECURITY: HOW HARD IS IT FOR YOU TO PAY FOR THE VERY BASICS LIKE FOOD, HOUSING, MEDICAL CARE, AND HEATING?: NOT HARD AT ALL

## 2025-04-22 SDOH — ECONOMIC STABILITY: TRANSPORTATION INSECURITY
IN THE PAST 12 MONTHS, HAS LACK OF TRANSPORTATION KEPT YOU FROM MEETINGS, WORK, OR FROM GETTING THINGS NEEDED FOR DAILY LIVING?: NO

## 2025-04-22 SDOH — ECONOMIC STABILITY: HOUSING INSECURITY
IN THE LAST 12 MONTHS, WAS THERE A TIME WHEN YOU DID NOT HAVE A STEADY PLACE TO SLEEP OR SLEPT IN A SHELTER (INCLUDING NOW)?: NO

## 2025-04-22 ASSESSMENT — LIFESTYLE VARIABLES
HOW MANY STANDARD DRINKS CONTAINING ALCOHOL DO YOU HAVE ON A TYPICAL DAY: 1 OR 2
AUDIT-C TOTAL SCORE: 3
SKIP TO QUESTIONS 9-10: 1
HOW OFTEN DO YOU HAVE A DRINK CONTAINING ALCOHOL: 2-3 TIMES A WEEK
HOW OFTEN DO YOU HAVE SIX OR MORE DRINKS ON ONE OCCASION: NEVER

## 2025-04-22 ASSESSMENT — SOCIAL DETERMINANTS OF HEALTH (SDOH)
HOW OFTEN DO YOU ATTEND CHURCH OR RELIGIOUS SERVICES?: MORE THAN 4 TIMES PER YEAR
DO YOU BELONG TO ANY CLUBS OR ORGANIZATIONS SUCH AS CHURCH GROUPS UNIONS, FRATERNAL OR ATHLETIC GROUPS, OR SCHOOL GROUPS?: YES
HOW OFTEN DO YOU HAVE A DRINK CONTAINING ALCOHOL: 2-3 TIMES A WEEK
HOW MANY DRINKS CONTAINING ALCOHOL DO YOU HAVE ON A TYPICAL DAY WHEN YOU ARE DRINKING: 1 OR 2
HOW OFTEN DO YOU ATTENT MEETINGS OF THE CLUB OR ORGANIZATION YOU BELONG TO?: MORE THAN 4 TIMES PER YEAR
HOW OFTEN DO YOU HAVE SIX OR MORE DRINKS ON ONE OCCASION: NEVER
IN A TYPICAL WEEK, HOW MANY TIMES DO YOU TALK ON THE PHONE WITH FAMILY, FRIENDS, OR NEIGHBORS?: MORE THAN THREE TIMES A WEEK
HOW OFTEN DO YOU ATTEND CHURCH OR RELIGIOUS SERVICES?: MORE THAN 4 TIMES PER YEAR
IN A TYPICAL WEEK, HOW MANY TIMES DO YOU TALK ON THE PHONE WITH FAMILY, FRIENDS, OR NEIGHBORS?: MORE THAN THREE TIMES A WEEK
IN THE PAST 12 MONTHS, HAS THE ELECTRIC, GAS, OIL, OR WATER COMPANY THREATENED TO SHUT OFF SERVICE IN YOUR HOME?: NO
HOW HARD IS IT FOR YOU TO PAY FOR THE VERY BASICS LIKE FOOD, HOUSING, MEDICAL CARE, AND HEATING?: NOT HARD AT ALL
DO YOU BELONG TO ANY CLUBS OR ORGANIZATIONS SUCH AS CHURCH GROUPS UNIONS, FRATERNAL OR ATHLETIC GROUPS, OR SCHOOL GROUPS?: YES
HOW OFTEN DO YOU ATTENT MEETINGS OF THE CLUB OR ORGANIZATION YOU BELONG TO?: MORE THAN 4 TIMES PER YEAR
WITHIN THE PAST 12 MONTHS, YOU WORRIED THAT YOUR FOOD WOULD RUN OUT BEFORE YOU GOT THE MONEY TO BUY MORE: NEVER TRUE
HOW OFTEN DO YOU GET TOGETHER WITH FRIENDS OR RELATIVES?: MORE THAN THREE TIMES A WEEK
HOW OFTEN DO YOU GET TOGETHER WITH FRIENDS OR RELATIVES?: MORE THAN THREE TIMES A WEEK

## 2025-04-23 ENCOUNTER — HOSPITAL ENCOUNTER (OUTPATIENT)
Dept: LAB | Facility: MEDICAL CENTER | Age: 65
End: 2025-04-23
Attending: FAMILY MEDICINE
Payer: MEDICARE

## 2025-04-23 DIAGNOSIS — M79.10 MUSCLE PAIN: ICD-10-CM

## 2025-04-23 DIAGNOSIS — E78.49 FAMILIAL HYPERLIPIDEMIA: ICD-10-CM

## 2025-04-23 LAB
ALBUMIN SERPL BCP-MCNC: 3.8 G/DL (ref 3.2–4.9)
ALBUMIN/GLOB SERPL: 1.4 G/DL
ALP SERPL-CCNC: 106 U/L (ref 30–99)
ALT SERPL-CCNC: 27 U/L (ref 2–50)
ANION GAP SERPL CALC-SCNC: 10 MMOL/L (ref 7–16)
AST SERPL-CCNC: 32 U/L (ref 12–45)
BILIRUB SERPL-MCNC: 0.4 MG/DL (ref 0.1–1.5)
BUN SERPL-MCNC: 16 MG/DL (ref 8–22)
CALCIUM ALBUM COR SERPL-MCNC: 9.2 MG/DL (ref 8.5–10.5)
CALCIUM SERPL-MCNC: 9 MG/DL (ref 8.5–10.5)
CHLORIDE SERPL-SCNC: 104 MMOL/L (ref 96–112)
CHOLEST SERPL-MCNC: 205 MG/DL (ref 100–199)
CK SERPL-CCNC: 85 U/L (ref 0–154)
CO2 SERPL-SCNC: 25 MMOL/L (ref 20–33)
CREAT SERPL-MCNC: 0.65 MG/DL (ref 0.5–1.4)
FASTING STATUS PATIENT QL REPORTED: NORMAL
GFR SERPLBLD CREATININE-BSD FMLA CKD-EPI: 96 ML/MIN/1.73 M 2
GLOBULIN SER CALC-MCNC: 2.8 G/DL (ref 1.9–3.5)
GLUCOSE SERPL-MCNC: 92 MG/DL (ref 65–99)
HDLC SERPL-MCNC: 80 MG/DL
LDLC SERPL CALC-MCNC: 113 MG/DL
POTASSIUM SERPL-SCNC: 4.3 MMOL/L (ref 3.6–5.5)
PROT SERPL-MCNC: 6.6 G/DL (ref 6–8.2)
SODIUM SERPL-SCNC: 139 MMOL/L (ref 135–145)
TRIGL SERPL-MCNC: 62 MG/DL (ref 0–149)

## 2025-04-23 PROCEDURE — 82550 ASSAY OF CK (CPK): CPT

## 2025-04-23 PROCEDURE — 36415 COLL VENOUS BLD VENIPUNCTURE: CPT

## 2025-04-23 PROCEDURE — 80061 LIPID PANEL: CPT

## 2025-04-23 PROCEDURE — 80053 COMPREHEN METABOLIC PANEL: CPT

## 2025-04-24 ENCOUNTER — RESULTS FOLLOW-UP (OUTPATIENT)
Dept: MEDICAL GROUP | Facility: MEDICAL CENTER | Age: 65
End: 2025-04-24

## 2025-04-25 ENCOUNTER — APPOINTMENT (OUTPATIENT)
Dept: MEDICAL GROUP | Facility: MEDICAL CENTER | Age: 65
End: 2025-04-25
Payer: MEDICARE

## 2025-04-25 VITALS
HEIGHT: 64 IN | OXYGEN SATURATION: 98 % | TEMPERATURE: 97.3 F | SYSTOLIC BLOOD PRESSURE: 126 MMHG | DIASTOLIC BLOOD PRESSURE: 82 MMHG | BODY MASS INDEX: 22.36 KG/M2 | WEIGHT: 130.95 LBS | HEART RATE: 71 BPM

## 2025-04-25 DIAGNOSIS — E06.9 THYROIDITIS: ICD-10-CM

## 2025-04-25 DIAGNOSIS — E78.49 FAMILIAL HYPERLIPIDEMIA: ICD-10-CM

## 2025-04-25 DIAGNOSIS — E55.9 VITAMIN D INSUFFICIENCY: ICD-10-CM

## 2025-04-25 DIAGNOSIS — Z12.12 SCREENING FOR COLORECTAL CANCER: ICD-10-CM

## 2025-04-25 DIAGNOSIS — Z12.11 SCREENING FOR COLORECTAL CANCER: ICD-10-CM

## 2025-04-25 DIAGNOSIS — Z00.00 MEDICARE ANNUAL WELLNESS VISIT, INITIAL: ICD-10-CM

## 2025-04-25 DIAGNOSIS — E04.1 THYROID NODULE: ICD-10-CM

## 2025-04-25 DIAGNOSIS — E53.9 VITAMIN B DEFICIENCY: ICD-10-CM

## 2025-04-25 DIAGNOSIS — B00.1 RECURRENT COLD SORES: ICD-10-CM

## 2025-04-25 DIAGNOSIS — K76.89 LIVER CYST: ICD-10-CM

## 2025-04-25 DIAGNOSIS — Z12.39 ENCOUNTER FOR BREAST CANCER SCREENING USING NON-MAMMOGRAM MODALITY: ICD-10-CM

## 2025-04-25 DIAGNOSIS — J30.2 SEASONAL ALLERGIES: ICD-10-CM

## 2025-04-25 DIAGNOSIS — R92.30 DENSE BREAST: ICD-10-CM

## 2025-04-25 DIAGNOSIS — M85.89 OSTEOPENIA OF MULTIPLE SITES: ICD-10-CM

## 2025-04-25 DIAGNOSIS — Z12.31 ENCOUNTER FOR SCREENING MAMMOGRAM FOR MALIGNANT NEOPLASM OF BREAST: ICD-10-CM

## 2025-04-25 PROBLEM — N95.2 POSTMENOPAUSAL ATROPHIC VAGINITIS: Status: RESOLVED | Noted: 2018-05-31 | Resolved: 2025-04-25

## 2025-04-25 PROCEDURE — 3079F DIAST BP 80-89 MM HG: CPT | Performed by: FAMILY MEDICINE

## 2025-04-25 PROCEDURE — 99214 OFFICE O/P EST MOD 30 MIN: CPT | Mod: 25 | Performed by: FAMILY MEDICINE

## 2025-04-25 PROCEDURE — 3074F SYST BP LT 130 MM HG: CPT | Performed by: FAMILY MEDICINE

## 2025-04-25 PROCEDURE — G0438 PPPS, INITIAL VISIT: HCPCS | Performed by: FAMILY MEDICINE

## 2025-04-25 RX ORDER — ATORVASTATIN CALCIUM 40 MG/1
40 TABLET, FILM COATED ORAL NIGHTLY
Qty: 100 TABLET | Refills: 3 | Status: SHIPPED | OUTPATIENT
Start: 2025-04-25

## 2025-04-25 RX ORDER — FEXOFENADINE HCL 60 MG/1
60 TABLET, FILM COATED ORAL DAILY
COMMUNITY

## 2025-04-25 RX ORDER — FLUTICASONE PROPIONATE 50 MCG
1 SPRAY, SUSPENSION (ML) NASAL DAILY
COMMUNITY

## 2025-04-25 RX ORDER — ASPIRIN 325 MG
325 TABLET ORAL EVERY 6 HOURS PRN
COMMUNITY

## 2025-04-25 ASSESSMENT — ENCOUNTER SYMPTOMS
COUGH: 0
GENERAL WELL-BEING: EXCELLENT
SHORTNESS OF BREATH: 0
CHILLS: 0
WHEEZING: 0
FEVER: 0
PALPITATIONS: 0

## 2025-04-25 ASSESSMENT — ACTIVITIES OF DAILY LIVING (ADL): BATHING_REQUIRES_ASSISTANCE: 0

## 2025-04-25 ASSESSMENT — PATIENT HEALTH QUESTIONNAIRE - PHQ9: CLINICAL INTERPRETATION OF PHQ2 SCORE: 0

## 2025-04-25 NOTE — ASSESSMENT & PLAN NOTE
Chronic condition, unstable, increase Lipitor to 40 mg daily as goal LDL is less than 70.  Follow-up with cardiology.

## 2025-04-25 NOTE — Clinical Note
REFERRAL APPROVAL NOTICE         Sent on April 25, 2025                   Sandra Franco  [address]  [place] [address]                   Dear Ms. Franco,    After a careful review of the medical information and benefit coverage, [facility] has processed your referral. See below for additional details.    If applicable, you must be actively enrolled with your insurance for coverage of the authorized service. If you have any questions regarding your coverage, please contact your insurance directly.    REFERRAL INFORMATION   Referral #:  [id number]  Referred-To Provider    Referred-By Provider:  Gastroenterology    Rigoberto Zepeda M.D.   [facility]      [address]  [address]  [facility] [address]  [phone] [id number]  [facility] [address]  [phone]    Referral Start Date:  04/25/2025  Referral End Date:   04/25/2026             SCHEDULING  If you do not already have an appointment, please call [phone] to make an appointment.     MORE INFORMATION  If you do not already have a [employer] account, sign up at: [email or web address]  You can access your medical information, make appointments, see lab results, billing information, and more.  If you have questions regarding this referral, please contact  the [facility] Referrals department at:             [phone]. Monday - Friday 8:00AM - 5:00PM.     Sincerely,    [facility]

## 2025-04-25 NOTE — ASSESSMENT & PLAN NOTE
Chronic condition, stable, recommended to take vitamin D 2000 national units daily along with calcium 1200 mg daily.  Check labs with next test.

## 2025-04-25 NOTE — ASSESSMENT & PLAN NOTE
Chronic condition, stable, continue vitamin B12 supplementation.  Recheck labs with next blood test.

## 2025-04-25 NOTE — ASSESSMENT & PLAN NOTE
Chronic condition, unstable, recommend strength training exercise along with calcium 1200 mg and vitamin D 2000 national's daily.  Recheck bone scan in 2 years

## 2025-04-25 NOTE — PROGRESS NOTES
Chief Complaint   Patient presents with    Annual Exam     Discuss labs  Discuss imaging       HPI:  Sandra Franco is a 64 y.o. here for Medicare Annual Wellness Visit     Patient Active Problem List    Diagnosis Date Noted    Liver cyst 04/25/2025    Recurrent cold sores 04/13/2022    Thyroiditis 04/13/2022    Osteopenia of multiple sites 07/01/2019    Seasonal allergies 10/16/2014    Vitamin D insufficiency 10/16/2014    Vitamin B deficiency 10/16/2014    Thyroid nodule 10/16/2014    Familial hyperlipidemia 06/06/2012       Current Outpatient Medications   Medication Sig Dispense Refill    aspirin (ASA) 325 MG Tab Take 325 mg by mouth every 6 hours as needed.      fexofenadine (ALLEGRA) 60 MG Tab Take 60 mg by mouth every day.      fluticasone (FLONASE) 50 MCG/ACT nasal spray Administer 1 Spray into affected nostril(S) every day.      atorvastatin (LIPITOR) 40 MG Tab Take 1 Tablet by mouth every evening. 100 Tablet 3     No current facility-administered medications for this visit.          Current supplements as per medication list.     Allergies: Patient has no known allergies.    Current social contact/activities: Spends time with family and friends    She  reports that she has never smoked. She has never used smokeless tobacco. She reports current alcohol use. She reports that she does not use drugs.  Counseling given: Not Answered      ROS:    Gait: Uses no assistive device  Ostomy: No  Other tubes: No  Amputations: No  Chronic oxygen use: No  Last eye exam: 06/2024  Wears hearing aids: No   : Denies any urinary leakage during the last 6 months    Screening:    Depression Screening  Little interest or pleasure in doing things?  0 - not at all  Feeling down, depressed , or hopeless? 0 - not at all  Patient Health Questionnaire Score: 0     If depressive symptoms identified deferred to follow up visit unless specifically addressed in assessment and plan.    Interpretation of PHQ-9 Total Score   Score  Severity   1-4 No Depression   5-9 Mild Depression   10-14 Moderate Depression   15-19 Moderately Severe Depression   20-27 Severe Depression    Screening for Cognitive Impairment  Do you or any of your friends or family members have any concern about your memory? No  Three Minute Recall (Village, Kitchen, Baby) 2/3    Jos clock face with all 12 numbers and set the hands to show 10 minutes past 11.  No    Cognitive concerns identified deferred for follow up unless specifically addressed in assessment and plan.    Fall Risk Assessment  Has the patient had two or more falls in the last year or any fall with injury in the last year?  No    Safety Assessment  Do you always wear your seatbelt?  Yes  Any changes to home needed to function safely? No  Difficulty hearing.  No  Patient counseled about all safety risks that were identified.    Functional Assessment ADLs  Are there any barriers preventing you from cooking for yourself or meeting nutritional needs?  No.    Are there any barriers preventing you from driving safely or obtaining transportation?  No.    Are there any barriers preventing you from using a telephone or calling for help?  No    Are there any barriers preventing you from shopping?  No.    Are there any barriers preventing you from taking care of your own finances?  No    Are there any barriers preventing you from managing your medications?  No    Are there any barriers preventing you from showering, bathing or dressing yourself? No    Are there any barriers preventing you from doing housework or laundry? No  Are there any barriers preventing you from using the toilet?No  Are you currently engaging in any exercise or physical activity?  Yes. Walking up and own hills 30 miles a week     Self-Assessment of Health  What is your perception of your health? Excellent    Do you sleep more than six hours a night? Yes    In the past 7 days, how much did pain keep you from doing your normal work? None    Do you  spend quality time with family or friends (virtually or in person)? Yes    Do you usually eat a heart healthy diet that constists of a variety of fruits, vegetables, whole grains and fiber? Yes    Do you eat foods high in fat and/or Fast Food more than three times per week? No    How concerned are you that your medical conditions are not being well managed? Not at all    Are you worried that in the next 2 months, you may not have stable housing that you own, rent, or stay in as part of a household? No      Advance Care Planning  Do you have an Advance Directive, Living Will, Durable Power of , or POLST? Yes  Advance Directive       is not on file - instructed patient to bring in a copy to scan into their chart      Health Maintenance Summary            Current Care Gaps       COVID-19 Vaccine (3 - 2024-25 season) Overdue since 9/1/2024 04/29/2021  Imm Admin: PFIZER PURPLE CAP SARS-COV-2 VACCINATION (12+)    04/06/2021  Imm Admin: PFIZER PURPLE CAP SARS-COV-2 VACCINATION (12+)              IMM DTaP/Tdap/Td Vaccine (1 - Tdap) Never done     No completion history exists for this topic.              Zoster (Shingles) Vaccines (1 of 2) Never done     No completion history exists for this topic.              Pneumococcal Vaccine: 50+ Years (1 of 1 - PCV) Never done     No completion history exists for this topic.                      Needs Review       Hepatitis C Screening  Tentatively Complete      06/27/2019  Hepatitis C Antibody component of HEP C VIRUS ANTIBODY                      Awaiting Completion       Mammogram (Yearly) Order placed this encounter      04/25/2025  Order placed for MA-SCREENING MAMMO BILAT W/TOMOSYNTHESIS W/CAD by Rigoberto Zepeda M.D.    07/25/2023  MA-SCREENING MAMMO BILAT W/TOMOSYNTHESIS W/CAD    02/23/2022  Done    03/08/2021  MA-SCREENING MAMMO BILAT W/TOMOSYNTHESIS W/CAD    10/08/2019  MA-SCREENING MAMMO BILAT W/TOMOSYNTHESIS W/CAD     Only the first 5 history entries have  been loaded, but more history exists.            Colorectal Cancer Screening (Colon Cancer Screening Cologuard Stool (FIT DNA) - Every 3 Years) Order placed this encounter      04/25/2025  Order placed for Referral to GI for Colonoscopy by Rigoberto Zepeda M.D.    03/21/2022  COLOGUARD COLON CANCER SCREENING                      Upcoming       Influenza Vaccine (Season Ended) Next due on 9/1/2025      No completion history exists for this topic.              Annual Wellness Visit (Yearly) Next due on 4/25/2026 04/25/2025  Level of Service: [place] INITIAL ANNUAL WELLNESS VISIT-INCLUDES PPPS    04/25/2025  Visit Dx: Medicare annual wellness visit, initial              Bone Density Scan (Every 2 Years) Next due on 1/8/2027 01/08/2025  DS-BONE DENSITY STUDY (DEXA)    06/17/2019  DS-BONE DENSITY STUDY (DEXA)                      Completed or No Longer Recommended       Hepatitis A Vaccine (Hep A) (Series Information) Aged Out     No completion history exists for this topic.              Hepatitis B Vaccine (Hep B) (Series Information) Aged Out     No completion history exists for this topic.              HPV Vaccines (Series Information) Aged Out     No completion history exists for this topic.              Polio Vaccine (Inactivated Polio) (Series Information) Aged Out     No completion history exists for this topic.              Meningococcal Immunization (Series Information) Aged Out     No completion history exists for this topic.                            Patient Care Team:  Rigoberto Zepeda M.D. as PCP - General (Family Medicine)  Esdras Almaguer M.D. as Consulting Physician (Endocrinology)        Social History     Tobacco Use    Smoking status: Never    Smokeless tobacco: Never   Vaping Use    Vaping status: Never Used   Substance Use Topics    Alcohol use: Yes     Comment: OCC    Drug use: No     Family History   Problem Relation Age of Onset    Other Mother         age 80. Heart attack/CABG age 60.  "Coronary stents.    Heart Disease Mother     Stroke Mother     Hyperlipidemia Mother     Other Father         age 80 hyperlipidemia    Heart Attack Father         Triple bypass    Other Brother         Brother  of a heart attack at age 41    Heart Attack Brother         Testosterone induced    Stroke Child         After AVM surgery    Hyperlipidemia Brother      She  has a past medical history of Anesthesia, ASTHMA, Other specified symptom associated with female genital organs, Seasonal allergies (10/16/2014), Thyroid disease, and Vitamin D insufficiency (10/16/2014).    She has no past medical history of Breast cancer (HCC).   Past Surgical History:   Procedure Laterality Date    THYROIDECTOMY  2015    Performed by Jonh Valente M.D. [facility]    PTERYGIUM EXCISION  2010    Performed by STARLA FOWLER [facility]    CYSTOSCOPY  2010    Performed by FABIOLA VAZ at [facility]    VAGINAL HYSTERECTOMY SCOPE TOTAL  2010    Performed by FABIOLA VAZ at [facility]    SEPTOPLASTY      Dr. Jackson    NASAL POLYPECTOMY      Dr. Craig    OTHER ORTHOPEDIC SURGERY      rt knee arthroscopic       Exam:   /82 (BP Location: Left arm, Patient Position: Sitting, BP Cuff Size: Adult)   Pulse 71   Temp 36.3 °C (97.3 °F) (Temporal)   Ht 1.626 m (5' 4\")   Wt 59.4 kg (130 lb 15.3 oz)   SpO2 98%  Body mass index is 22.48 kg/m².    Hearing good.    Dentition good  Alert, oriented in no acute distress.  Eye contact is good, speech goal directed, affect calm    Assessment and Plan. The following treatment and monitoring plan is recommended:      1. Medicare annual wellness visit, initial    2. Familial hyperlipidemia  - atorvastatin (LIPITOR) 40 MG Tab; Take 1 Tablet by mouth every evening.  Dispense: 100 Tablet; Refill: 3  - Comp Metabolic Panel; Future  - Lipid Profile; Future    3. Osteopenia of " multiple sites    4. Recurrent cold sores    5. Seasonal allergies    6. Thyroid nodule  - TSH; Future  - FREE THYROXINE; Future    7. Thyroiditis  - TSH; Future  - FREE THYROXINE; Future    8. Vitamin B deficiency  - CBC WITH DIFFERENTIAL; Future  - VITAMIN B12; Future    9. Vitamin D insufficiency  - VITAMIN D,25 HYDROXY (DEFICIENCY); Future    10. Dense breast  - US-SCREENING WHOLE BREAST BILATERAL (3D SCREENING); Future    11. Encounter for screening mammogram for malignant neoplasm of breast  - MA-SCREENING MAMMO BILAT W/TOMOSYNTHESIS W/CAD; Future    12. Encounter for breast cancer screening using non-mammogram modality  - US-SCREENING WHOLE BREAST BILATERAL (3D SCREENING); Future    13. Screening for colorectal cancer  - Referral to GI for Colonoscopy    14. Liver cyst  - US-RUQ; Future    Other orders  - aspirin (ASA) 325 MG Tab; Take 325 mg by mouth every 6 hours as needed.  - fexofenadine (ALLEGRA) 60 MG Tab; Take 60 mg by mouth every day.  - fluticasone (FLONASE) 50 MCG/ACT nasal spray; Administer 1 Spray into affected nostril(S) every day.      Services suggested: No services needed at this time  Health Care Screening: Age-appropriate preventive services recommended by USPTF and ACIP covered by Medicare were discussed today. Services ordered if indicated and agreed upon by the patient.  Referrals offered: Community-based lifestyle interventions to reduce health risks and promote self-management and wellness, fall prevention, nutrition, physical activity, tobacco-use cessation, weight loss, and mental health services as per orders if indicated.    Discussion today about general wellness and lifestyle habits:    Prevent falls and reduce trip hazards; Cautioned about securing or removing rugs.  Have a working fire alarm and carbon monoxide detector;   Engage in regular physical activity and social activities         Problem   Liver Cyst    CT 1/8/25 showed Tiny liver dome cyst anteriorly is unchanged.      Recurrent Cold Sores    She has history of recurrent cold sores, on Valtrex as needed.     Thyroiditis    Recent ultrasound on 2/23/2022 showed Left thyroidectomy. No mass lesions in the surgical bed.  2.  A 1.36 cm right thyroid nodule, slightly larger since prior study, but unchanged in morphology since 2014. It is benign and does not require follow-up.  3.  Slightly increased vascularity of the right thyroid lobe, which may be due to thyroiditis.    Her TSH levels have consistently been normal, and she is not currently on any medication. Her last thyroid ultrasound, conducted in 2022, indicated a stable thyroid nodule, which does not necessitate a follow-up. Recent TPO antibodies have shown a mildly elevated level of 14.      Osteopenia of Multiple Sites    Bone density scan on 6/17/2019 showed According to the World Health Organization classification, bone mineral density of this patient is osteopenia with increased risk of fracture in the lumbar region and osteopenia with increased risk of fracture in the right femur.      Bone scan 1/8/25 showed When compared with the most recent study dated 6/17/2019, there has been a 6.6% decrease in the bone mineral density of the lumbar spine and a 4.7% decrease in the bone mineral density of the right femur.     IMPRESSION:     According to the World Health Organization classification, bone mineral density of this patient is osteopenic in the lumbar spine and right femur. There has been significant worsening.  She denies any recent falls.     Seasonal Allergies    She has seasonal allergies, has nasal polyps also, currently following with allergist.  She has been on aspirin 325 mg twice daily to prevent exacerbation of nasal polyps.  She is following with ENT.  On allegra and Flonase     Vitamin D Insufficiency    Previous history of vitamin D insufficiency.  Recommended to take vitamin D due to history of osteopenia.     Vitamin B Deficiency    History of vitamin B12  deficiency.  She is taking vitamin B12 supplementation daily.     Thyroid Nodule    Reports history of partial thyroidectomy due to abnormal FNA biopsy for 1 left nodule.  Last ultrasound 06/20/2019 showed The left thyroid lobe has been removed. Right thyroid lobe nodule appears stable in size and echotexture.     ACR TI-RADS Recommendations  TR4 - follow up ultrasound in 1,2,3 and 5 years.      Recent thyroid ultrasound 2/23/2022  showed left thyroidectomy. No mass lesions in the surgical bed.  2.  A 1.36 cm right thyroid nodule, slightly larger since prior study, but unchanged in morphology since 2014. It is benign and does not require follow-up.  3.  Slightly increased vascularity of the right thyroid lobe, which may be due to thyroiditis     Currently does not have any symptoms     Familial Hyperlipidemia    Significant family history of heart disease.  Mother had triple bypass and recently her dad also had triple bypass.  Mother has history of bilateral carotid endarterectomies.  CT cardiac scoring on 06/20/2019 showed Coronary calcification:  LMA - 0.0  LCX 22.9  .6  RCA 28.6  PDA - 0.0    CT cardiac score 1/8/25 showed  Coronary calcification:  LMA - 0.0  LCX - 115.6  LAD - 328.3  RCA - 65.6     Total Calcium Score: 590.5     Total Calcium Score: 220.1                  Review of Systems   Constitutional:  Negative for chills and fever.   Respiratory:  Negative for cough, shortness of breath and wheezing.    Cardiovascular:  Negative for chest pain, palpitations and leg swelling.          Physical Exam  Constitutional:       Appearance: Normal appearance. She is well-developed and well-groomed.   HENT:      Head: Normocephalic and atraumatic.   Eyes:      General:         Right eye: No discharge.         Left eye: No discharge.      Conjunctiva/sclera: Conjunctivae normal.   Cardiovascular:      Rate and Rhythm: Normal rate and regular rhythm.      Heart sounds: Normal heart sounds. No murmur heard.      No friction rub. No gallop.   Pulmonary:      Effort: Pulmonary effort is normal. No respiratory distress.      Breath sounds: Normal breath sounds. No wheezing or rales.   Neurological:      General: No focal deficit present.      Mental Status: She is alert. Mental status is at baseline.      Gait: Gait is intact.   Psychiatric:         Mood and Affect: Mood and affect normal.         Behavior: Behavior normal.              I reviewed with patient recent labs resulted on 4/23/2025.        Problem List Items Addressed This Visit       Familial hyperlipidemia    Chronic condition, unstable, increase Lipitor to 40 mg daily as goal LDL is less than 70.  Follow-up with cardiology.         Relevant Medications    atorvastatin (LIPITOR) 40 MG Tab    Other Relevant Orders    Comp Metabolic Panel    Lipid Profile    Liver cyst    New condition, unstable, check liver ultrasound         Relevant Orders    US-RUQ    Osteopenia of multiple sites    Chronic condition, unstable, recommend strength training exercise along with calcium 1200 mg and vitamin D 2000 national's daily.  Recheck bone scan in 2 years                 Thyroid nodule    Chronic condition, stable, recheck TSH and free thyroxine level.         Relevant Orders    TSH    FREE THYROXINE    Thyroiditis    Chronic condition, stable, recheck thyroid function test.         Relevant Orders    TSH    FREE THYROXINE    Vitamin B deficiency    Chronic condition, stable, continue vitamin B12 supplementation.  Recheck labs with next blood test.         Relevant Orders    CBC WITH DIFFERENTIAL    VITAMIN B12    Vitamin D insufficiency    Chronic condition, stable, recommended to take vitamin D 2000 national units daily along with calcium 1200 mg daily.  Check labs with next test.         Relevant Orders    VITAMIN D,25 HYDROXY (DEFICIENCY)     Patient declined all vaccines.      Return in about 4 months (around 8/25/2025).

## 2025-06-05 ENCOUNTER — OFFICE VISIT (OUTPATIENT)
Facility: MEDICAL CENTER | Age: 65
End: 2025-06-05
Attending: FAMILY MEDICINE
Payer: MEDICARE

## 2025-06-05 VITALS
WEIGHT: 130 LBS | HEIGHT: 64 IN | OXYGEN SATURATION: 98 % | BODY MASS INDEX: 22.2 KG/M2 | DIASTOLIC BLOOD PRESSURE: 84 MMHG | RESPIRATION RATE: 18 BRPM | SYSTOLIC BLOOD PRESSURE: 140 MMHG | HEART RATE: 76 BPM

## 2025-06-05 DIAGNOSIS — R93.1 AGATSTON CAC SCORE, >400: Primary | ICD-10-CM

## 2025-06-05 DIAGNOSIS — R94.31 NONSPECIFIC ABNORMAL ELECTROCARDIOGRAM (ECG) (EKG): ICD-10-CM

## 2025-06-05 DIAGNOSIS — E78.49 FAMILIAL HYPERLIPIDEMIA: ICD-10-CM

## 2025-06-05 LAB — EKG IMPRESSION: NORMAL

## 2025-06-05 PROCEDURE — 93010 ELECTROCARDIOGRAM REPORT: CPT | Performed by: INTERNAL MEDICINE

## 2025-06-05 PROCEDURE — 3079F DIAST BP 80-89 MM HG: CPT | Performed by: INTERNAL MEDICINE

## 2025-06-05 PROCEDURE — 99204 OFFICE O/P NEW MOD 45 MIN: CPT | Performed by: INTERNAL MEDICINE

## 2025-06-05 PROCEDURE — 93005 ELECTROCARDIOGRAM TRACING: CPT | Mod: TC | Performed by: INTERNAL MEDICINE

## 2025-06-05 PROCEDURE — 3077F SYST BP >= 140 MM HG: CPT | Performed by: INTERNAL MEDICINE

## 2025-06-05 PROCEDURE — 99214 OFFICE O/P EST MOD 30 MIN: CPT | Performed by: INTERNAL MEDICINE

## 2025-06-05 RX ORDER — ATORVASTATIN CALCIUM 80 MG/1
80 TABLET, FILM COATED ORAL NIGHTLY
Qty: 100 TABLET | Refills: 3 | Status: SHIPPED | OUTPATIENT
Start: 2025-06-05

## 2025-06-05 ASSESSMENT — ENCOUNTER SYMPTOMS
PALPITATIONS: 0
NERVOUS/ANXIOUS: 0
MYALGIAS: 0
CARDIOVASCULAR NEGATIVE: 1
WEAKNESS: 0
BRUISES/BLEEDS EASILY: 1
CHILLS: 0
DIZZINESS: 0
DOUBLE VISION: 0
CLAUDICATION: 0
HEADACHES: 0
VOMITING: 0
EYES NEGATIVE: 1
SHORTNESS OF BREATH: 0
PSYCHIATRIC NEGATIVE: 1
GASTROINTESTINAL NEGATIVE: 1
DEPRESSION: 0
FOCAL WEAKNESS: 0
COUGH: 0
BLURRED VISION: 0
NAUSEA: 0
RESPIRATORY NEGATIVE: 1
CONSTITUTIONAL NEGATIVE: 1
FEVER: 0
MUSCULOSKELETAL NEGATIVE: 1
ABDOMINAL PAIN: 0
NEUROLOGICAL NEGATIVE: 1
WEIGHT LOSS: 0

## 2025-06-05 NOTE — PROGRESS NOTES
Chief Complaint   Patient presents with    Other     Elevated coronary artery calcium score    Hyperlipidemia      Familial hyperlipidemia         Subjective     Sandra Tony Franco is a 65 y.o. female who presents today as a consult from Rigoberto Lin for positive CT coronary calcium study.     Thank you for allowing me to evaluate Mrs. Franco, who as you know is a 65 year old female with positive CT coronary calcium study, dyslipidemia, lifelong nonsmoker, family history of coronary artery disease. She is clinically doing well from cardiac standpoint. She denies fatigue, chest pain, shortness of breath, palpitations, lower extremity edema, dizziness or syncope. She keeps active walking, golfing. Of note, I cared for her mother and father.      Past Medical History[1]  Past Surgical History[2]  Family History   Problem Relation Age of Onset    Other Mother         age 80. Heart attack/CABG age 60. Coronary stents.    Heart Disease Mother     Stroke Mother     Hyperlipidemia Mother     Other Father         age 80 hyperlipidemia    Heart Attack Father         Triple bypass    Other Brother         Brother  of a heart attack at age 41    Heart Attack Brother         Testosterone induced    Stroke Child         After AVM surgery    Hyperlipidemia Brother      Social History     Socioeconomic History    Marital status:      Spouse name: Not on file    Number of children: Not on file    Years of education: Not on file    Highest education level: Bachelor's degree (e.g., BA, AB, BS)   Occupational History    Not on file   Tobacco Use    Smoking status: Never    Smokeless tobacco: Never   Vaping Use    Vaping status: Never Used   Substance and Sexual Activity    Alcohol use: Yes     Comment: OCC    Drug use: No    Sexual activity: Yes     Partners: Male     Comment: work as realtor   Other Topics Concern    Not on file   Social History Narrative    Not on file     Social Drivers of Health      Financial Resource Strain: Low Risk  (4/22/2025)    Overall Financial Resource Strain (CARDIA)     Difficulty of Paying Living Expenses: Not hard at all   Food Insecurity: No Food Insecurity (4/22/2025)    Hunger Vital Sign     Worried About Running Out of Food in the Last Year: Never true     Ran Out of Food in the Last Year: Never true   Transportation Needs: No Transportation Needs (4/22/2025)    PRAPARE - Transportation     Lack of Transportation (Medical): No     Lack of Transportation (Non-Medical): No   Physical Activity: Sufficiently Active (4/22/2025)    Exercise Vital Sign     Days of Exercise per Week: 5 days     Minutes of Exercise per Session: 150+ min   Stress: No Stress Concern Present (4/22/2025)    [employer] [place] - Occupational Stress Questionnaire     Feeling of Stress : Not at all   Social Connections: Socially Integrated (4/22/2025)    Social Connection and Isolation Panel [NHANES]     Frequency of Communication with Friends and Family: More than three times a week     Frequency of Social Gatherings with Friends and Family: More than three times a week     Attends Tenriism Services: More than 4 times per year     Active Member of Clubs or Organizations: Yes     Attends Club or Organization Meetings: More than 4 times per year     Marital Status:    Intimate Partner Violence: Not on file   Housing Stability: Low Risk  (4/22/2025)    Housing Stability Vital Sign     Unable to Pay for Housing in the Last Year: No     Number of Times Moved in the Last Year: 0     Homeless in the Last Year: No     Allergies[3]  Encounter Medications[4]  Review of Systems   Constitutional: Negative.  Negative for chills, fever, malaise/fatigue and weight loss.   HENT: Negative.  Negative for hearing loss.    Eyes: Negative.  Negative for blurred vision and double vision.   Respiratory: Negative.  Negative for cough and shortness of breath.    Cardiovascular: Negative.  Negative for  "chest pain, palpitations, claudication and leg swelling.   Gastrointestinal: Negative.  Negative for abdominal pain, nausea and vomiting.   Genitourinary: Negative.  Negative for dysuria and urgency.   Musculoskeletal: Negative.  Negative for joint pain and myalgias.   Skin: Negative.  Negative for itching and rash.   Neurological: Negative.  Negative for dizziness, focal weakness, weakness and headaches.   Endo/Heme/Allergies:  Bruises/bleeds easily.   Psychiatric/Behavioral: Negative.  Negative for depression. The patient is not nervous/anxious.               Objective     BP (!) 140/84 (BP Location: Left arm, Patient Position: Sitting, BP Cuff Size: Adult)   Pulse 76   Resp 18   Ht 1.626 m (5' 4\")   Wt 59 kg (130 lb)   LMP 07/17/2010   SpO2 98%   BMI 22.31 kg/m²     Physical Exam  Constitutional:       Appearance: Normal appearance. She is well-developed and normal weight.   HENT:      Head: Normocephalic and atraumatic.   Neck:      Vascular: No JVD.   Cardiovascular:      Rate and Rhythm: Normal rate and regular rhythm.      Heart sounds: Normal heart sounds.   Pulmonary:      Effort: Pulmonary effort is normal.      Breath sounds: Normal breath sounds.   Abdominal:      General: Bowel sounds are normal.      Palpations: Abdomen is soft.      Comments: No hepatosplenomegaly.   Musculoskeletal:         General: Normal range of motion.   Lymphadenopathy:      Cervical: No cervical adenopathy.   Skin:     General: Skin is warm and dry.   Neurological:      Mental Status: She is alert and oriented to person, place, and time.            CARDIAC STUDIES/PROCEDURES:    CT CARDIAC SCORING (01/08/25)  Coronary calcification:  LMA - 0.0  LCX - 115.6  LAD - 328.3  RCA - 65.6  Total Calcium Score: 590.5  Percentile: Calcium score is above the 75th percentile for the patient's age and sex.  IMPRESSION:  1. Calcium score today is 590.5. Score was 220.1 on 6/17/19.  2. Calcium Score of 400 or greater:  (study result " reviewed)     EKG was ordered for positive CT coronary calcium study, performed on (06/05/25) was reviewed: EKG, personally interpreted shows sinus rhythm with poor R wave progression.     Laboratory results of (04/232/5) were reviewed. Cholesterol profile of 205/62/80/113 mg/dL noted.    Assessment & Plan     1. Agatston CAC score, >400  EKG      2. Nonspecific abnormal electrocardiogram (ECG) (EKG)        3. Familial hyperlipidemia  atorvastatin (LIPITOR) 80 MG tablet    Comp Metabolic Panel    Lipid Profile    LIPO-ASSOC PHOS A2 (LP-PLA2)          Medical Decision Making: Today's Assessment/Status/Plan:        Positive CT coronary calcium study: She is a 65 year old female with positive CT coronary calcium study. She is clinically doing well from coronary standpoint. We will continue with current medical therapy including atorvastatin. We will perform an echocardiogram and myocardial perfusion imaging study.  Abnormal EKG: The EKG is abnormal as described above. We will perform an echocardiogram and a myocardial perfusion imaging study.   Hyperlipidemia: She is doing well on statin therapy without myalgia symptoms. Her LDL level is not at target. We will increase atorvastatin dose to 80 mg. We will repeat labs including fasting lipid profile.   Health maintenance: The blood pressure measurement is high today, however, usually well controlled. She is on aspirin for nasal polyps.     We will follow up in 3 months.    Thank you for this consult.                      [1]   Past Medical History:  Diagnosis Date    Anesthesia     nausea    ASTHMA     no meds    CAD (coronary artery disease)     Hyperlipidemia     Other specified symptom associated with female genital organs     lots of cramping, cyst    Seasonal allergies 10/16/2014    Thyroid disease     Vitamin D insufficiency 10/16/2014   [2]   Past Surgical History:  Procedure Laterality Date    THYROIDECTOMY  4/16/2015    Performed by John Valente M.D. at  SURGERY SAME DAY [facility]    PTERYGIUM EXCISION  11/17/2010    Performed by STARLA FOWLER at SURGERY SAME DAY [facility]    CYSTOSCOPY  8/6/2010    Performed by FABIOLA VAZ at SURGERY SAME DAY [facility]    VAGINAL HYSTERECTOMY SCOPE TOTAL  8/6/2010    Performed by FABIOLA VAZ at SURGERY SAME DAY [facility]    SEPTOPLASTY  2006    Dr. Jackson    NASAL POLYPECTOMY  2006    Dr. Craig    OTHER ORTHOPEDIC SURGERY  1987    rt knee arthroscopic   [3] No Known Allergies  [4]   Outpatient Encounter Medications as of 6/5/2025   Medication Sig Dispense Refill    atorvastatin (LIPITOR) 80 MG tablet Take 1 Tablet by mouth every evening. 100 Tablet 3    aspirin (ASA) 325 MG Tab Take 325 mg by mouth every 6 hours as needed.      fexofenadine (ALLEGRA) 60 MG Tab Take 60 mg by mouth every day.      fluticasone (FLONASE) 50 MCG/ACT nasal spray Administer 1 Spray into affected nostril(S) every day.      [DISCONTINUED] atorvastatin (LIPITOR) 40 MG Tab Take 1 Tablet by mouth every evening. 100 Tablet 3     No facility-administered encounter medications on file as of 6/5/2025.

## 2025-06-13 ENCOUNTER — RESULTS FOLLOW-UP (OUTPATIENT)
Dept: CARDIOLOGY | Facility: MEDICAL CENTER | Age: 65
End: 2025-06-13

## 2025-06-13 ENCOUNTER — HOSPITAL ENCOUNTER (OUTPATIENT)
Dept: RADIOLOGY | Facility: MEDICAL CENTER | Age: 65
End: 2025-06-13
Attending: INTERNAL MEDICINE
Payer: MEDICARE

## 2025-06-13 DIAGNOSIS — R94.31 NONSPECIFIC ABNORMAL ELECTROCARDIOGRAM (ECG) (EKG): ICD-10-CM

## 2025-06-13 PROCEDURE — 93018 CV STRESS TEST I&R ONLY: CPT | Performed by: INTERNAL MEDICINE

## 2025-06-13 PROCEDURE — A9502 TC99M TETROFOSMIN: HCPCS

## 2025-06-13 PROCEDURE — 78452 HT MUSCLE IMAGE SPECT MULT: CPT | Mod: 26 | Performed by: INTERNAL MEDICINE

## 2025-06-13 NOTE — Clinical Note
[facility]  [address] [place] [address]    [id number]    Sandra Franco  [address] [place] [address]    June 13, 2025    Member Name: Sandra Franco   Member Number: [id number]   Reference Number: [id number]   Approved Services: Echos and EKG   Approved Service Dates: 06/13/2025 - 10/11/2025   Requesting Provider: Tucker Whitman   Requested Provider: [facility]     Dear Sandra Franco:    The following medical service(s) requested by Tucker Whitman have been approved:    Procedure Code Procedure Code Name Requested Quantity Approved Quantity Status   07513 (CPT®) UT ECHO HEART XTHORACIC,COMPLETE W DOPPLER 1 1 Authorized       Approved Quantity means the number of visits approved for medication treatments and/or medical services.    The services should be provided by [facility] no later than 10/11/2025. Please contact the provider listed below with any questions.     Provider Information:  [facility]  [phone]    Your plan benefit may require a deductible, co-payment or coinsurance for these services. This authorization does not guarantee [facility] will pay the claim for services that you receive. Payment by [facility] for these services is subject to the terms of your Evidence of Coverage, your eligibility at the time of service, and confirmation of benefit coverage.    For any questions or additional information, please contact Customer Service:    [facility] Toll Free: [phone]  TTY users dial: [phone]   Call Center Hours:  Oct 1 - Mar 31, Mon - Fri 7 AM to 8 PM PST  Oct 1 - Mar 31, Sat - Sun 8 AM to 8 PM PST  Apr 1 - Sep 30, Mon - Fri 7 AM to 8 PM PST   Office Hours: Mon - Fri 8 AM to 5 PM PST   E-mail: [email or web address]   Website:  [email or web address]      This information is available for free in other languages. Please contact  Customer Service at the phone number above for more information. [facility] complies with applicable Federal civil rights laws and does not discriminate on the basis of race, color, national origin, age, disability or sex.    Sincerely,     Healthcare Utilization Management Department     Cc: [facility]   Tucker Whitman    Multi-Language Insert  Multi- Services  English: We have free  services to answer any questions you may have about our health or drug plan.  To get an , just call us at [phone].  Someone who speaks English/Language can help you.  This is a free service.  Bahamian: Tenemos servicios de intérprete sin costo alguno  para responder cualquier pregunta que pueda tener sobre nuestro plan de moni o medicamentos. Para hablar con un intérprete, por favor llame al [phone]. Alguien que hable español le podrá ayudar. Lyubov es un servicio gratuito.  Chinese Mandarin: ?????????????????????????????? ???????????????? [phone]????????????????? ?????????  Chinese Cantonese: ?????????????????????????????? ????????????? [phone]???????????????????? ????????  Tagalog:  Mayroon kaming libreng serbisyo sa reardonsasalcolleen roblerogjayy o panggamot.  chloé Hernandez  [phone]. Maaakevin Horn.  Juno [place].  Chinese:  Nous proposons brooke services gratuits d'interprétation pour répondre à toutes sandeep questions relatives à notre régime de santé ou d'assurance-médicaments. Pour accéder au service d'interprétation, il vous suffit de nous appeler au [phone]. Un interlocuteur parkeegan Françjerod pourra vous aider. Ce service est gratuit.  Hebrew:  Jeffery king có d?ch v? thông d?ch mi?n phí ð? tr? l?i các câu h?i v? chýõng s?c kh?e và chýõng trìn thu?c men.  N?u quí v? c?n thông d?ch viên joce g?i [phone] s? có nhân viên nói ti?ng Vi?t giúp ð? quí v?. Ðây là d?ch v? mi?n phí .  Sierra Leonean:  Unser kostenser Dolmetscherservice beantwortet Ihren Fragen zu unserem Gesundheits- und Arzneimittelplan. Unsere Dolmetscher erreichen Sie [phone]. Man wird Ihnen heavenly auf [facility]. Dieser Service ist [facility].  Indonesian:  ??? ?? ?? ?? ?? ??? ?? ??? ?? ???? ?? ?? ???? ???? ????. ?? ???? ????? ?? [phone] ??? ??? ????.  ???? ?? ???? ?? ?? ????. ? ???? ??? ?????.   Tunisian: Åñëè ó âàñ âîçíèêíóò âîïðîñû îòíîñèòåëüíî ñòðàõîâîãî èëè ìåäèêàìåíòíîãî ïëàíà, âû ìîæåòå âîñïîëüçîâàòüñÿ íàøèìè áåñïëàòíûìè óñëóãàìè ïåðåâîä÷èêîâ. ×òîáû âîñïîëüçîâàòüñÿ óñëóãàìè ïåðåâîä÷èêà, ïîçâîíèòå íàì ïî òåëåôîíó [phone]. Âàì îêàæåò ïîìîùü ñîòðóäíèê, êîòîðûé ãîâîðèò ïî-póññêè. Äàííàÿ óñëóãà áåñïëàòíàÿ.  Thai: ÅääÇ äÞÏã ÎÏãÇÊ ÇáãÊÑÌã ÇáÝæÑí ÇáãÌÇäíÉ ááÅÌÇÈÉ Úä Ãí ÃÓÆáÉ ÊÊÚáÞ ÈÇáÕÍÉ Ãæ ÌÏæá ÇáÃÏæíÉ áÏíäÇ. ááÍÕæá Úáì ãÊÑÌã ÝæÑí¡ áíÓ Úáíß Óæì ÇáÇÊÕÇá ÈäÇ Úáì [phone] . ÓíÞæã ÔÎÕ ãÇ íÊÍÏË ÇáÚÑÈíÉ ÈãÓÇÚÏÊß. åÐå ÎÏãÉ ãÌÇäíÉ.  Shereen: ????? ????????? ?? ??? ?? ????? ?? ???? ??? ???? ???? ?? ?????? ?? ???? ???? ?? ??? ????? ??? ????? ???????? ?????? ?????? ???. ?? ???????? ??????? ???? ?? ???, ?? ???? [phone] ?? ??? ????. ??? ??????? ?? ?????? ????? ?? ???? ??? ?? ???? ??. ?? ?? ????? ???? ??.   Indian:  È disponibile un servizio di interpretariato gratuito per rispondere a eventuali domande sul nostro piano sanitario e farmaceutico. Per un interprete, contattare il manpreet [phone]. Un nostro incaricato reynaldo parla Italianovi fornirà l'assistenza necessaria. È un servizio gratuito.  Portugués:  Dispomos de serviços de interpretação gratuitos para responder a qualquer questão que tenha acerca do nosso plano de saúde ou de medicação. Para obter um intérprete, contacte-nos através do número [phone]. Irá encontrar alguém que fale o idioma  Português para o ajudar.  Lyubov serviço é gratuito.  Cymraes Creole:  Nou genyen sèvis entèprèt gratis isela reponn tout kesyon ou ta genyen konsènan plan medikal oswa dwòg nou an.  Isela jwenn yon entèprèt, jis rele nou nan [phone]. Yon moun ki pale Kreyòl kapab malachi w.  Sa a se yon sèvis ki gratis.  Polish:  Umo¿liwiamy bezp³atne skorzystanie z us³ug t³umacza ustnego, który pomo¿e w uzyskaniu odpowiedzi na temat planu zdrowotnego lub dawotfnia vestaw. Korin skorzystaæ z pomocy t³umacza znaj¹cego mathew peres¿y zadzwoniæ pod numer [phone]. Ta us³uga jest bezp³atna.  Czech: ????? ??????? ????????????????????? ??????????????????????????????????[phone] ???????????????? ? ????????????????? ?????

## 2025-06-25 ENCOUNTER — HOSPITAL ENCOUNTER (OUTPATIENT)
Dept: RADIOLOGY | Facility: MEDICAL CENTER | Age: 65
End: 2025-06-25
Attending: FAMILY MEDICINE
Payer: MEDICARE

## 2025-06-25 ENCOUNTER — RESULTS FOLLOW-UP (OUTPATIENT)
Dept: MEDICAL GROUP | Facility: MEDICAL CENTER | Age: 65
End: 2025-06-25

## 2025-06-25 DIAGNOSIS — N28.89 RENAL MASS: Primary | ICD-10-CM

## 2025-06-25 DIAGNOSIS — K76.89 LIVER CYST: ICD-10-CM

## 2025-06-25 PROCEDURE — 76705 ECHO EXAM OF ABDOMEN: CPT

## 2025-06-27 ENCOUNTER — TELEPHONE (OUTPATIENT)
Dept: HEALTH INFORMATION MANAGEMENT | Facility: OTHER | Age: 65
End: 2025-06-27
Payer: MEDICARE

## 2025-07-01 ENCOUNTER — ANCILLARY PROCEDURE (OUTPATIENT)
Facility: MEDICAL CENTER | Age: 65
End: 2025-07-01
Attending: INTERNAL MEDICINE
Payer: MEDICARE

## 2025-07-01 DIAGNOSIS — R94.31 NONSPECIFIC ABNORMAL ELECTROCARDIOGRAM (ECG) (EKG): ICD-10-CM

## 2025-07-01 DIAGNOSIS — E78.49 FAMILIAL HYPERLIPIDEMIA: ICD-10-CM

## 2025-07-01 LAB
LV EJECT FRACT MOD 2C 99903: 57.24
LV EJECT FRACT MOD 4C 99902: 58.06
LV EJECT FRACT MOD BP 99901: 56.95

## 2025-07-01 PROCEDURE — 93306 TTE W/DOPPLER COMPLETE: CPT | Mod: 26 | Performed by: INTERNAL MEDICINE

## 2025-07-01 PROCEDURE — 93306 TTE W/DOPPLER COMPLETE: CPT

## 2025-07-02 ENCOUNTER — APPOINTMENT (OUTPATIENT)
Dept: URBAN - METROPOLITAN AREA CLINIC 15 | Facility: CLINIC | Age: 65
Setting detail: DERMATOLOGY
End: 2025-07-02

## 2025-07-02 DIAGNOSIS — D69.2 OTHER NONTHROMBOCYTOPENIC PURPURA: ICD-10-CM

## 2025-07-02 DIAGNOSIS — D18.0 HEMANGIOMA: ICD-10-CM

## 2025-07-02 DIAGNOSIS — L81.4 OTHER MELANIN HYPERPIGMENTATION: ICD-10-CM

## 2025-07-02 DIAGNOSIS — L82.1 OTHER SEBORRHEIC KERATOSIS: ICD-10-CM

## 2025-07-02 DIAGNOSIS — Z71.89 OTHER SPECIFIED COUNSELING: ICD-10-CM

## 2025-07-02 DIAGNOSIS — D22 MELANOCYTIC NEVI: ICD-10-CM

## 2025-07-02 PROBLEM — D18.01 HEMANGIOMA OF SKIN AND SUBCUTANEOUS TISSUE: Status: ACTIVE | Noted: 2025-07-02

## 2025-07-02 PROBLEM — D22.71 MELANOCYTIC NEVI OF RIGHT LOWER LIMB, INCLUDING HIP: Status: ACTIVE | Noted: 2025-07-02

## 2025-07-02 PROCEDURE — ? COUNSELING

## 2025-07-02 PROCEDURE — ? REFERRAL CORRESPONDENCE

## 2025-07-02 PROCEDURE — ? SUNSCREEN RECOMMENDATIONS

## 2025-07-02 RX ORDER — ATORVASTATIN CALCIUM 40 MG/1
40 TABLET, FILM COATED ORAL EVERY EVENING
Qty: 100 TABLET | Refills: 3 | OUTPATIENT
Start: 2025-07-02

## 2025-07-02 ASSESSMENT — LOCATION ZONE DERM
LOCATION ZONE: SCALP
LOCATION ZONE: ARM
LOCATION ZONE: LEG
LOCATION ZONE: TRUNK

## 2025-07-02 ASSESSMENT — LOCATION DETAILED DESCRIPTION DERM
LOCATION DETAILED: EPIGASTRIC SKIN
LOCATION DETAILED: LEFT PROXIMAL DORSAL FOREARM
LOCATION DETAILED: RIGHT MEDIAL INFERIOR CHEST
LOCATION DETAILED: RIGHT LATERAL ABDOMEN
LOCATION DETAILED: RIGHT CENTRAL FRONTAL SCALP
LOCATION DETAILED: RIGHT PROXIMAL CALF
LOCATION DETAILED: RIGHT SUPERIOR UPPER BACK
LOCATION DETAILED: LEFT ANTERIOR DISTAL UPPER ARM

## 2025-07-02 ASSESSMENT — LOCATION SIMPLE DESCRIPTION DERM
LOCATION SIMPLE: RIGHT SCALP
LOCATION SIMPLE: RIGHT UPPER BACK
LOCATION SIMPLE: LEFT UPPER ARM
LOCATION SIMPLE: ABDOMEN
LOCATION SIMPLE: RIGHT CALF
LOCATION SIMPLE: LEFT FOREARM
LOCATION SIMPLE: CHEST

## 2025-07-02 NOTE — TELEPHONE ENCOUNTER
Received request via: Pharmacy    Was the patient seen in the last year in this department? Yes    Does the patient have an active prescription (recently filled or refills available) for medication(s) requested? No    Pharm[facility] Name: [facility]    Does the patient have [facility] Plus and need 100-day supply? (This applies to ALL medications) Yes, quantity updated to 100 days

## 2025-07-03 NOTE — TELEPHONE ENCOUNTER
----- Message from Tucekr Whitman M.D.  -----  Please notify patient of echocardiogram showing mild to moderate mitral regurgitation. We will repeat echocardiogram in one  or two years. Thank you.  JASSON

## 2025-07-05 ENCOUNTER — HOSPITAL ENCOUNTER (OUTPATIENT)
Dept: RADIOLOGY | Facility: MEDICAL CENTER | Age: 65
End: 2025-07-05
Attending: PHYSICIAN ASSISTANT
Payer: MEDICARE

## 2025-07-05 DIAGNOSIS — S82.141A CLOSED FRACTURE OF RIGHT TIBIAL PLATEAU, INITIAL ENCOUNTER: ICD-10-CM

## 2025-07-05 DIAGNOSIS — M25.561 ACUTE PAIN OF RIGHT KNEE: ICD-10-CM

## 2025-07-05 PROCEDURE — 73700 CT LOWER EXTREMITY W/O DYE: CPT | Mod: RT

## 2025-07-06 ENCOUNTER — OFFICE VISIT (OUTPATIENT)
Dept: URGENT CARE | Facility: CLINIC | Age: 65
End: 2025-07-06
Payer: MEDICARE

## 2025-07-06 VITALS
SYSTOLIC BLOOD PRESSURE: 168 MMHG | HEART RATE: 64 BPM | DIASTOLIC BLOOD PRESSURE: 98 MMHG | HEIGHT: 64 IN | BODY MASS INDEX: 21.85 KG/M2 | WEIGHT: 128 LBS | RESPIRATION RATE: 14 BRPM | OXYGEN SATURATION: 97 % | TEMPERATURE: 97.5 F

## 2025-07-06 DIAGNOSIS — S82.141A CLOSED FRACTURE OF RIGHT TIBIAL PLATEAU, INITIAL ENCOUNTER: Primary | ICD-10-CM

## 2025-07-06 DIAGNOSIS — S82.141A CLOSED FRACTURE OF RIGHT TIBIAL PLATEAU, INITIAL ENCOUNTER: ICD-10-CM

## 2025-07-06 PROCEDURE — 3077F SYST BP >= 140 MM HG: CPT

## 2025-07-06 PROCEDURE — 99213 OFFICE O/P EST LOW 20 MIN: CPT

## 2025-07-06 PROCEDURE — 3080F DIAST BP >= 90 MM HG: CPT

## 2025-07-06 RX ORDER — IBUPROFEN 600 MG/1
600 TABLET, FILM COATED ORAL EVERY 6 HOURS PRN
Qty: 30 TABLET | Refills: 0 | Status: SHIPPED | OUTPATIENT
Start: 2025-07-06

## 2025-07-06 NOTE — PROGRESS NOTES
"Subjective:   Sandra Franco is a 65 y.o. female who presents for Pain (R knee, was seen at [facility] yesterday, requesting pain management. )      HPI  Patient seen in [facility] orthopedic clinic yesterday after ground-level fall leading to tibial plateau fracture.  Presents today for pain management.          Review of Systems   Musculoskeletal:  Positive for joint pain (right knee pain).   All other systems reviewed and are negative.      Medical History:  Past Medical History[1]    Allergies:  Allergies[2]    Social history, surgical history, medications, and current problem list reviewed today in Epic.       Objective:     BP (!) 168/98   Pulse 64   Temp 36.4 °C (97.5 °F)   Resp 14   Ht 1.626 m (5' 4\")   Wt 58.1 kg (128 lb)   SpO2 97%     Physical Exam  Vitals reviewed.   Constitutional:       General: She is not in acute distress.     Appearance: Normal appearance. She is not ill-appearing, toxic-appearing or diaphoretic.   Cardiovascular:      Rate and Rhythm: Normal rate.      Pulses: Normal pulses.   Pulmonary:      Effort: Pulmonary effort is normal.   Musculoskeletal:      Cervical back: Normal range of motion.      Right knee: Swelling present. Tenderness present.   Skin:     General: Skin is warm.      Capillary Refill: Capillary refill takes less than 2 seconds.   Neurological:      General: No focal deficit present.      Mental Status: She is alert and oriented to person, place, and time.   Psychiatric:         Mood and Affect: Mood normal.         Behavior: Behavior normal.         Assessment/Plan:       Diagnosis and associated orders:     1. Closed fracture of right tibial plateau, initial encounter  gabapentin (NEURONTIN) 100 MG tablet    ibuprofen (MOTRIN) 600 MG Tab    diclofenac sodium (VOLTAREN) 1 % Gel           Comments/MDM:   I personally reviewed prior external notes and test results pertinent to today's visit as well as additional imaging and testing completed in clinic today. "     Very pleasant 65-year-old female presenting for pain management.  Patient was seen at [facility] yesterday and diagnosed with tibial plateau fracture of right side.  Patient instructed to take Tylenol and follow-up with orthopedic clinic on Tuesday for discussion of surgery.  Patient presents to clinic today with complaints of swelling and pain.  Discussed with patient that urgent care does not prescribe controlled substances but we are willing to prescribe alternative methods for pain management such as gabapentin, ibuprofen, Voltaren gel.  Patient encouraged to continue Tylenol as well as ice and add in these measures for pain relief.  Educated that if she is unable to achieve pain relief she should seek higher level care to ensure that fracture has not worsened.  Patient is clinically stable at today's acute urgent care visit. Vital signs are normal and reassuring.  No acute distress noted. Appropriate for outpatient management at this time. No red flag warnings noted.  Patient given strict instructions to follow up with emergency room if they develop any red flag warnings which were discussed in depth.  They verbalized understanding. Differential diagnosis, natural history, supportive care, and indications for immediate follow-up discussed. All questions answered. They agree with the plan of care.      Please note that this dictation was created using voice recognition software. I have made every reasonable attempt to correct obvious errors, but I expect that there are errors of grammar and possibly content that I did not discover before finalizing the note.              [1]   Past Medical History:  Diagnosis Date    Anesthesia     nausea    ASTHMA     no meds    CAD (coronary artery disease)     Hyperlipidemia     Other specified symptom associated with female genital organs     lots of cramping, cyst    Seasonal allergies 10/16/2014    Thyroid disease     Vitamin D insufficiency 10/16/2014    [2] No Known Allergies

## 2025-07-07 DIAGNOSIS — S82.141A CLOSED FRACTURE OF RIGHT TIBIAL PLATEAU, INITIAL ENCOUNTER: ICD-10-CM

## 2025-07-07 RX ORDER — GABAPENTIN 100 MG/1
100 CAPSULE ORAL 3 TIMES DAILY PRN
Qty: 60 CAPSULE | Refills: 0 | Status: SHIPPED | OUTPATIENT
Start: 2025-07-07 | End: 2025-07-22

## 2025-07-07 RX ORDER — GABAPENTIN 100 MG/1
100 CAPSULE ORAL 3 TIMES DAILY PRN
Qty: 1 CAPSULE | Refills: 0 | Status: SHIPPED | OUTPATIENT
Start: 2025-07-07 | End: 2025-07-07

## 2025-07-22 ENCOUNTER — APPOINTMENT (OUTPATIENT)
Dept: RADIOLOGY | Facility: MEDICAL CENTER | Age: 65
End: 2025-07-22
Attending: FAMILY MEDICINE
Payer: MEDICARE

## 2025-07-22 ENCOUNTER — APPOINTMENT (OUTPATIENT)
Dept: ADMISSIONS | Facility: MEDICAL CENTER | Age: 65
End: 2025-07-22
Attending: ORTHOPAEDIC SURGERY
Payer: MEDICARE

## 2025-07-22 PROBLEM — M17.12 DEGENERATIVE ARTHRITIS OF LEFT KNEE: Status: ACTIVE | Noted: 2025-07-22

## 2025-07-22 NOTE — H&P (VIEW-ONLY)
Chief Complaint: Right knee pain    Sandra Franco is a 65 y.o. female who is here for evaluation of her right knee.  She has a longstanding history of right knee arthritis.  She has treated this with anti-inflammatory medications, physical therapy, activity modification.  She was doing well until she slipped while at [employer].  She landed on a flexed knee, was noted to have a lateral tibial plateau fracture.  She initially saw Dr. Barakat, but given her level of degenerative changes and the fact that she had pretty much a pure depression fracture of her lateral tibial plateau she was referred to our office for possible acute knee replacement.  She describes dull achy pain that has worsened significantly.  She has been put in a hinged knee brace, she states her swelling is much improved but she does have continued pain      Medications Ordered Prior to Encounter[1]  Past Medical History[2]  Past Surgical History[3]  Allergies[4]  Body mass index is 21.97 kg/m².      Past medical history, social history, surgical history, and review of systems were reviewed and otherwise noncontributory except for above    Exam:  Gait: Not tested, seated in a wheelchair  Alert and oriented x3, no apparent distress  Normocephalic atraumatic, trachea midline, neck is supple  Breathing is nonlabored  Abdomen is nonacute  Right lower extremity: Moderate effusion of right knee, significantly decreased from previous exam  Deformity: Slight valgus deformity right knee  ROM 15 to 105 degrees tested actively, can be passively flex and additional 10 degrees  Pain with forced flexion: Present  Pain with patellofemoral grind: Present  Pain with palpation of tibiofemoral joint line: Present    Left lower extremity: Fairly benign left knee exam, only minimal pain with forced flexion or patellofemoral grind    +DF/PF/EHL Bilaterally; sensation intact to light touch    Images:    DX-KNEE 2- RIGHT  An AP and lateral of her right knee taken  7/22/2025 continue to show a   lateral tibial plateau fracture as well as significant arthritic changes   including loss of joint space, osteophyte formation, subchondral sclerosis          Impression/Plan:    1: she has symptomatic right knee arthritis despite conservative care which has included anti-inflammatory medications, physical therapy, activity modification.  Her case is unique and that she does have a lateral tibial plateau depression fracture.  We will plan to use cemented implants,'s have stem extensions and tibial cones available.  I will also get a Justin CT which I do think is appropriate for surgical planning, we will review this CT scan pre-RA her to surgery.  she is having pain on a daily basis, pain that is limiting their activities of daily life and difficulty walking any significant distances. The patient is a candidate for a joint replacement.  I have discussed the risks, benefits, and alternatives of a joint replacement with them.   I have shown them their x-rays, the x-rays of the patient who had undergone a joint replacement.  I will give them a brochure about joint replacements and we do recommend attendance at one of our joint replacement classes. We will work towards getting them on the surgical schedule for the near future, and have given them contact information if questions arise.   She does have some friends who have had their knees replaced.  I have told her that her recovery may be more challenging given the setting of the acute fracture.  I have given her the option of waiting and addressing the knee at a later point, working on range of motion exercises.  She does have an increased risk of stiffness.  she would like to move forward with planning for the knee replacement    Surgery: Right knee replacement  Metal Allergy: Denies  Planned Implants: Priscilla triathlon cemented with Justin, stem extensions and tibial cones available  DVT Prophylaxis: Aspirin  Clearances: None  Imaging:  Right knee Justin CT ordered  Date Restrictions: None from our standpoint  They do have a trip planned at the end of September.  She is going to check into insurance and whether or not she can reschedule.  We will discuss this more with her after surgery  The risks of surgery were discussed with the patient.     These include pain, bleeding, infection, fractures and dislocations as well as damage to surrounding structures or neurovascular compromise.  The risks include the possibility of need for further surgery, lack of healing, lack of symptom relief.  The elevated risk of DVT following a joint replacement was discussed with the patient, and a plan was made for postoperative DVT prophylaxis.  We did discuss the possibility of leg length discrepancy both apparent and actual following joint replacement.      They do express an understanding that these are man-made parts, with limited lifespans, and that we would not be turning them into a bionic human being. We did discuss the various approaches to a joint replacement, and made no guarantees about incision size or postoperative discharge plans. Certainly there could be anesthetic complications such as heart attack, stroke, respiratory failure, or even death.      In addition to these significant complications, we have also informed the patient that there is some risk of dissatisfaction with their joint replacement.  I have told them that a joint replacement changes your life, usually for the better, but that certain patients do not like how their joint feels or moves after surgery.  Specifically with knee replacements, we allow patients to kneel after their incision is healed , but I have warned them that patients do not also often love the feeling of kneeling on a knee replacement, and that we recommend wearing kneepads    All of the patient's questions were answered, they were shown models of the implants and images of their x-rays.  she expressed understanding and  wished to proceed. Shared decision making tools were used to help in surgical decision making. Non-operative measures were also discussed.    We have told the patient a front wheeled walker will be required in the perioperative period. We did discuss the importance of physical therapy and the risk of permanent stiffness or muscle weakness after surgery.       *Please note that this dictation was performed using voice recognition software.  I have tried to correct any errors I have found, but reserve the right to addend this note at a later time if errors or incorrect dictation are found         [1]   Current Outpatient Medications on File Prior to Visit   Medication Sig Dispense Refill    ibuprofen (MOTRIN) 600 MG Tab Take 1 Tablet by mouth every 6 hours as needed for Mild Pain or Moderate Pain. 30 Tablet 0    diclofenac sodium (VOLTAREN) 1 % Gel Apply 4 g topically 4 times a day as needed (pain). 150 g 0    atorvastatin (LIPITOR) 80 MG tablet Take 1 Tablet by mouth every evening. 100 Tablet 3    aspirin (ASA) 325 MG Tab Take 325 mg by mouth every 6 hours as needed.      fexofenadine (ALLEGRA) 60 MG Tab Take 60 mg by mouth every day.      fluticasone (FLONASE) 50 MCG/ACT nasal spray Administer 1 Spray into affected nostril(S) every day.       No current facility-administered medications on file prior to visit.   [2]   Past Medical History:  Diagnosis Date    Anesthesia     nausea    ASTHMA     no meds    CAD (coronary artery disease)     Hyperlipidemia     Other specified symptom associated with female genital organs     lots of cramping, cyst    Seasonal allergies 10/16/2014    Thyroid disease     Vitamin D insufficiency 10/16/2014   [3]   Past Surgical History:  Procedure Laterality Date    THYROIDECTOMY  4/16/2015    Performed by John Valente M.D. at SURGERY SAME DAY [facility] ORS    PTERYGIUM EXCISION  11/17/2010    Performed by STARLA FOWLER at SURGERY SAME DAY [facility]    CYSTOSCOPY  8/6/2010     Performed by FABIOLA VAZ at SURGERY SAME DAY [facility] ORS    VAGINAL HYSTERECTOMY SCOPE TOTAL  8/6/2010    Performed by FABIOLA VAZ at SURGERY SAME DAY [facility]    SEPTOPLASTY  2006    Dr. Jackson    NASAL POLYPECTOMY  2006    Dr. Craig    OTHER ORTHOPEDIC SURGERY  1987    rt knee arthroscopic   [4] No Known Allergies

## 2025-07-23 ENCOUNTER — PRE-ADMISSION TESTING (OUTPATIENT)
Dept: ADMISSIONS | Facility: MEDICAL CENTER | Age: 65
End: 2025-07-23
Attending: ORTHOPAEDIC SURGERY
Payer: MEDICARE

## 2025-07-23 PROBLEM — M17.11 PRIMARY OSTEOARTHRITIS OF RIGHT KNEE: Status: ACTIVE | Noted: 2025-07-22

## 2025-07-23 NOTE — PREPROCEDURE INSTRUCTIONS
PreAdmit Telephone Appointment: Reviewed the Preparing for your procedure handout with patient. Patient instructed per pharmacy guidelines regarding taking, holding or contacting provider for instructions on regularly prescribed medications before surgery.    Confirmed with patient where to check in morning of surgery.     Testing appointment and Nurse Navigator appointment 7/24.

## 2025-07-23 NOTE — PREADMIT AVS NOTE
Current Medications   Medication Instructions    VITAMIN D PO Stop 7 days before surgery    Acetaminophen (TYLENOL PO) As needed medication, may take if needed, including morning of procedure     suzetrigine (JOURNAVX) 50 MG tablet Follow instructions from surgeon or specialist.    ibuprofen (MOTRIN) 600 MG Tab Stop 5 days before surgery    diclofenac sodium (VOLTAREN) 1 % Gel Stop 5 days before surgery    atorvastatin (LIPITOR) 80 MG tablet Continue taking as prescribed.    aspirin (ASA) 325 MG Tab Follow instructions from surgeon or specialist.    fexofenadine (ALLEGRA) 60 MG Tab Continue taking as prescribed.    fluticasone (FLONASE) 50 MCG/ACT nasal spray As needed medication, may take if needed, including morning of procedure

## 2025-07-24 ENCOUNTER — HOSPITAL ENCOUNTER (OUTPATIENT)
Dept: RADIOLOGY | Facility: MEDICAL CENTER | Age: 65
End: 2025-07-24
Attending: ORTHOPAEDIC SURGERY
Payer: MEDICARE

## 2025-07-24 ENCOUNTER — PRE-ADMISSION TESTING (OUTPATIENT)
Dept: ADMISSIONS | Facility: MEDICAL CENTER | Age: 65
End: 2025-07-24
Attending: ORTHOPAEDIC SURGERY
Payer: MEDICARE

## 2025-07-24 DIAGNOSIS — Z01.818 PRE-OP EXAM: ICD-10-CM

## 2025-07-24 PROCEDURE — 73700 CT LOWER EXTREMITY W/O DYE: CPT | Mod: RT

## 2025-07-24 NOTE — DISCHARGE PLANNING
DISCHARGE PLANNING NOTE - TOTAL JOINT    Procedure: ROBOTIC RIGHT TOTAL KNEE ARTHROPLASTY   Procedure Date: 7/28/2025  Insurance: Payor: [facility] / Plan: [facility] PREFERRED  / Product Type: Medicare Advantage /    Equipment currently available at home?  crutches, front-wheel walker, shower chair, wheelchair, and Ice machine, toilet seat riser  Steps into the home? 3  Steps within the home? 0  Toilet height? Standard w/ toilet seat riser  Type of shower? walk-in shower  Home Oxygen? No  Portable tank?    Oxygen Provider:  Planning same day discharge: Yes    Is Outpatient Physical Therapy set up after surgery? Yes  Did you take the Total Joint Class and where or did you receive an Educational booklet? No- flyer given for class, received NAON book.  Who will be your transportation home on day of discharge? [place]- spouse    Have you made arrangements to have someone stay with you at home for the first 3 days following discharge, and if so, whom? [place]- spouse    Have you notified your surgeon that you do not have transportation or someone to help you after discharge? N/A    Are you planning on going to a transitional care facility, for example a skilled nursing facility, post operatively for rehab, and if so, have you contacted your insurance plan to see if they cover this? No      Met with the pt and . Pt given a copy of Home Safety Checklist, Equipment Resource Guide, CHG scrub kit and instructions. Expected process in Recovery Room and dc criteria discussed with pt. All questions answered and verbalizes understanding of all instructions. No dc needs identified at this time. Anticipate dc to home without barriers. MRSA swab obtained.

## 2025-07-26 ENCOUNTER — ANESTHESIA EVENT (OUTPATIENT)
Dept: SURGERY | Facility: MEDICAL CENTER | Age: 65
End: 2025-07-26
Payer: MEDICARE

## 2025-07-28 ENCOUNTER — APPOINTMENT (OUTPATIENT)
Dept: RADIOLOGY | Facility: MEDICAL CENTER | Age: 65
End: 2025-07-28
Attending: PHYSICIAN ASSISTANT
Payer: MEDICARE

## 2025-07-28 ENCOUNTER — ANESTHESIA (OUTPATIENT)
Dept: SURGERY | Facility: MEDICAL CENTER | Age: 65
End: 2025-07-28
Payer: MEDICARE

## 2025-07-28 ENCOUNTER — HOSPITAL ENCOUNTER (OUTPATIENT)
Facility: MEDICAL CENTER | Age: 65
End: 2025-07-28
Attending: ORTHOPAEDIC SURGERY | Admitting: ORTHOPAEDIC SURGERY
Payer: MEDICARE

## 2025-07-28 PROCEDURE — 700101 HCHG RX REV CODE 250: Performed by: ANESTHESIOLOGY

## 2025-07-28 PROCEDURE — 700105 HCHG RX REV CODE 258: Performed by: ORTHOPAEDIC SURGERY

## 2025-07-28 PROCEDURE — 700111 HCHG RX REV CODE 636 W/ 250 OVERRIDE (IP): Performed by: ANESTHESIOLOGY

## 2025-07-28 RX ORDER — LIDOCAINE HYDROCHLORIDE 20 MG/ML
INJECTION, SOLUTION EPIDURAL; INFILTRATION; INTRACAUDAL; PERINEURAL PRN
Status: DISCONTINUED | OUTPATIENT
Start: 2025-07-28 | End: 2025-07-28 | Stop reason: SURG

## 2025-07-28 RX ORDER — DEXAMETHASONE SODIUM PHOSPHATE 4 MG/ML
INJECTION, SOLUTION INTRA-ARTICULAR; INTRALESIONAL; INTRAMUSCULAR; INTRAVENOUS; SOFT TISSUE PRN
Status: DISCONTINUED | OUTPATIENT
Start: 2025-07-28 | End: 2025-07-28 | Stop reason: SURG

## 2025-07-28 RX ORDER — HYDROMORPHONE HYDROCHLORIDE 2 MG/ML
INJECTION, SOLUTION INTRAMUSCULAR; INTRAVENOUS; SUBCUTANEOUS PRN
Status: DISCONTINUED | OUTPATIENT
Start: 2025-07-28 | End: 2025-07-28 | Stop reason: SURG

## 2025-07-28 RX ORDER — ROCURONIUM BROMIDE 10 MG/ML
INJECTION, SOLUTION INTRAVENOUS PRN
Status: DISCONTINUED | OUTPATIENT
Start: 2025-07-28 | End: 2025-07-28 | Stop reason: SURG

## 2025-07-28 RX ORDER — CEFAZOLIN SODIUM 1 G/3ML
INJECTION, POWDER, FOR SOLUTION INTRAMUSCULAR; INTRAVENOUS PRN
Status: DISCONTINUED | OUTPATIENT
Start: 2025-07-28 | End: 2025-07-28 | Stop reason: SURG

## 2025-07-28 RX ORDER — MAGNESIUM SULFATE HEPTAHYDRATE 40 MG/ML
INJECTION, SOLUTION INTRAVENOUS PRN
Status: DISCONTINUED | OUTPATIENT
Start: 2025-07-28 | End: 2025-07-28 | Stop reason: SURG

## 2025-07-28 RX ORDER — DEXMEDETOMIDINE HYDROCHLORIDE 100 UG/ML
INJECTION, SOLUTION INTRAVENOUS PRN
Status: DISCONTINUED | OUTPATIENT
Start: 2025-07-28 | End: 2025-07-28 | Stop reason: SURG

## 2025-07-28 RX ORDER — PHENYLEPHRINE HCL IN 0.9% NACL 1 MG/10 ML
SYRINGE (ML) INTRAVENOUS PRN
Status: DISCONTINUED | OUTPATIENT
Start: 2025-07-28 | End: 2025-07-28 | Stop reason: SURG

## 2025-07-28 RX ORDER — MIDAZOLAM HYDROCHLORIDE 1 MG/ML
INJECTION INTRAMUSCULAR; INTRAVENOUS PRN
Status: DISCONTINUED | OUTPATIENT
Start: 2025-07-28 | End: 2025-07-28 | Stop reason: SURG

## 2025-07-28 RX ORDER — TRANEXAMIC ACID 100 MG/ML
INJECTION, SOLUTION INTRAVENOUS PRN
Status: DISCONTINUED | OUTPATIENT
Start: 2025-07-28 | End: 2025-07-28 | Stop reason: SURG

## 2025-07-28 RX ORDER — ONDANSETRON 2 MG/ML
INJECTION INTRAMUSCULAR; INTRAVENOUS PRN
Status: DISCONTINUED | OUTPATIENT
Start: 2025-07-28 | End: 2025-07-28 | Stop reason: SURG

## 2025-07-28 RX ORDER — BUPIVACAINE HYDROCHLORIDE 2.5 MG/ML
INJECTION, SOLUTION EPIDURAL; INFILTRATION; INTRACAUDAL; PERINEURAL
Status: COMPLETED | OUTPATIENT
Start: 2025-07-28 | End: 2025-07-28

## 2025-07-28 RX ADMIN — FENTANYL CITRATE 50 MCG: 50 INJECTION, SOLUTION INTRAMUSCULAR; INTRAVENOUS at 07:05

## 2025-07-28 RX ADMIN — ROCURONIUM BROMIDE 10 MG: 10 INJECTION INTRAVENOUS at 07:42

## 2025-07-28 RX ADMIN — LIDOCAINE HYDROCHLORIDE 60 MG: 20 INJECTION, SOLUTION EPIDURAL; INFILTRATION; INTRACAUDAL; PERINEURAL at 06:59

## 2025-07-28 RX ADMIN — HYDROMORPHONE HYDROCHLORIDE 0.5 MG: 2 INJECTION INTRAMUSCULAR; INTRAVENOUS; SUBCUTANEOUS at 07:26

## 2025-07-28 RX ADMIN — PROPOFOL 150 MG: 10 INJECTION, EMULSION INTRAVENOUS at 06:59

## 2025-07-28 RX ADMIN — Medication 200 MCG: at 08:08

## 2025-07-28 RX ADMIN — HYDROMORPHONE HYDROCHLORIDE 0.5 MG: 2 INJECTION INTRAMUSCULAR; INTRAVENOUS; SUBCUTANEOUS at 08:11

## 2025-07-28 RX ADMIN — DEXMEDETOMIDINE 20 MCG: 100 INJECTION, SOLUTION INTRAVENOUS at 07:04

## 2025-07-28 RX ADMIN — ONDANSETRON 4 MG: 2 INJECTION INTRAMUSCULAR; INTRAVENOUS at 07:57

## 2025-07-28 RX ADMIN — BUPIVACAINE HYDROCHLORIDE 15 ML: 2.5 INJECTION, SOLUTION EPIDURAL; INFILTRATION; INTRACAUDAL at 06:40

## 2025-07-28 RX ADMIN — CEFAZOLIN 2 G: 1 INJECTION, POWDER, FOR SOLUTION INTRAMUSCULAR; INTRAVENOUS at 06:59

## 2025-07-28 RX ADMIN — TRANEXAMIC ACID 1000 MG: 100 INJECTION, SOLUTION INTRAVENOUS at 07:58

## 2025-07-28 RX ADMIN — DEXAMETHASONE SODIUM PHOSPHATE 8 MG: 4 INJECTION INTRA-ARTICULAR; INTRALESIONAL; INTRAMUSCULAR; INTRAVENOUS; SOFT TISSUE at 07:06

## 2025-07-28 RX ADMIN — SODIUM CHLORIDE, POTASSIUM CHLORIDE, SODIUM LACTATE AND CALCIUM CHLORIDE: 600; 310; 30; 20 INJECTION, SOLUTION INTRAVENOUS at 06:52

## 2025-07-28 RX ADMIN — MAGNESIUM SULFATE HEPTAHYDRATE 2 G: 2 INJECTION, SOLUTION INTRAVENOUS at 07:20

## 2025-07-28 RX ADMIN — FENTANYL CITRATE 50 MCG: 50 INJECTION, SOLUTION INTRAMUSCULAR; INTRAVENOUS at 06:55

## 2025-07-28 RX ADMIN — SUGAMMADEX 200 MG: 100 INJECTION, SOLUTION INTRAVENOUS at 07:57

## 2025-07-28 RX ADMIN — FENTANYL CITRATE 50 MCG: 50 INJECTION, SOLUTION INTRAMUSCULAR; INTRAVENOUS at 07:40

## 2025-07-28 RX ADMIN — FENTANYL CITRATE 50 MCG: 50 INJECTION, SOLUTION INTRAMUSCULAR; INTRAVENOUS at 07:57

## 2025-07-28 RX ADMIN — HYDROMORPHONE HYDROCHLORIDE 0.5 MG: 2 INJECTION INTRAMUSCULAR; INTRAVENOUS; SUBCUTANEOUS at 08:00

## 2025-07-28 RX ADMIN — DEXMEDETOMIDINE 20 MCG: 100 INJECTION, SOLUTION INTRAVENOUS at 07:11

## 2025-07-28 RX ADMIN — TRANEXAMIC ACID 1000 MG: 100 INJECTION, SOLUTION INTRAVENOUS at 07:11

## 2025-07-28 RX ADMIN — MIDAZOLAM HYDROCHLORIDE 1 MG: 1 INJECTION, SOLUTION INTRAMUSCULAR; INTRAVENOUS at 06:53

## 2025-07-28 RX ADMIN — ROCURONIUM BROMIDE 60 MG: 10 INJECTION INTRAVENOUS at 06:59

## 2025-07-28 RX ADMIN — Medication 200 MCG: at 07:59

## 2025-07-28 RX ADMIN — Medication 100 MCG: at 07:32

## 2025-07-28 RX ADMIN — SODIUM CHLORIDE, POTASSIUM CHLORIDE, SODIUM LACTATE AND CALCIUM CHLORIDE: 600; 310; 30; 20 INJECTION, SOLUTION INTRAVENOUS at 07:52

## 2025-07-28 RX ADMIN — Medication 200 MCG: at 07:51

## 2025-07-28 ASSESSMENT — COGNITIVE AND FUNCTIONAL STATUS - GENERAL
MOVING FROM LYING ON BACK TO SITTING ON SIDE OF FLAT BED: A LITTLE
MOBILITY SCORE: 18
STANDING UP FROM CHAIR USING ARMS: A LITTLE
SUGGESTED CMS G CODE MODIFIER MOBILITY: CK
TURNING FROM BACK TO SIDE WHILE IN FLAT BAD: A LITTLE
WALKING IN HOSPITAL ROOM: A LITTLE
CLIMB 3 TO 5 STEPS WITH RAILING: A LITTLE
MOVING TO AND FROM BED TO CHAIR: A LITTLE

## 2025-07-28 ASSESSMENT — PAIN SCALES - GENERAL: PAIN_LEVEL: 4

## 2025-07-28 ASSESSMENT — PAIN DESCRIPTION - PAIN TYPE: TYPE: SURGICAL PAIN

## 2025-07-28 ASSESSMENT — FIBROSIS 4 INDEX: FIB4 SCORE: 0.72

## 2025-07-28 ASSESSMENT — GAIT ASSESSMENTS
DEVIATION: DECREASED HEEL STRIKE;DECREASED TOE OFF
DISTANCE (FEET): 75
ASSISTIVE DEVICE: FRONT WHEEL WALKER
GAIT LEVEL OF ASSIST: STANDBY ASSIST

## 2025-07-28 NOTE — OR NURSING
0820:Pt arrived pacu stg 1 from OR. RASS -2 . O2 via mask, BS clear, spontaneous.  RLE dressing CDI, odette and ace wrap, ice appiled. +2 pedal pulses, foot pink warm dry,   0825: Moves all extremities,to 2L NC. Pt calm, cooperative. This RN oriented pt to place and situation. Pt denies  pain, denies  nausea, back to sleep.   0900: awake. drinks water, well tolerated.   0955: dr hogan at bedside, updated pt. Pt reports mild nausea, mild pain  1000: meets pacu stg 2 criteria

## 2025-07-28 NOTE — ANESTHESIA PROCEDURE NOTES
Airway    Date/Time: 7/28/2025 7:00 AM    Performed by: Gwendolyn Caruso M.D.  Authorized by: Gwendolyn Caruso M.D.    Location:  OR  Urgency:  Elective  Difficult Airway: No    Indications for Airway Management:  Anesthesia      Spontaneous Ventilation: absent    Sedation Level:  Deep  Preoxygenated: Yes    Patient Position:  Sniffing  Mask Difficulty Assessment:  1 - vent by mask  Final Airway Type:  Endotracheal airway  Final Endotracheal Airway:  ETT  Cuffed: Yes    Technique Used for Successful ETT Placement:  Direct laryngoscopy    Insertion Site:  Oral  Blade Type:  Gerry  Laryngoscope Blade/Videolaryngoscope Blade Size:  3  ETT Size (mm):  7.0  Measured from:  Teeth  ETT to Teeth (cm):  21  Placement Verified by: auscultation and capnometry    Cormack-Lehane Classification:  Grade I - full view of glottis  Number of Attempts at Approach:  1

## 2025-07-28 NOTE — ANESTHESIA POSTPROCEDURE EVALUATION
Patient: Sandra Franco    Procedure Summary       Date: 07/28/25 Room / Location:  OR  / SURGERY [facility]    Anesthesia Start: 0652 Anesthesia Stop: 0823    Procedure: ROBOTIC RIGHT TOTAL KNEE ARTHROPLASTY (Right: Knee) Diagnosis:       Primary osteoarthritis of right knee      (Primary osteoarthritis of right knee [M17.11])    Surgeons: Konrad Roy M.D. Responsible Provider: Gwendolyn Caruso M.D.    Anesthesia Type: general ASA Status: 2            Final Anesthesia Type: general  Last vitals  BP   Blood Pressure : 122/73    Temp   36.1 °C (97 °F)    Pulse   82   Resp   16    SpO2   92 %      Anesthesia Post Evaluation    Patient location during evaluation: PACU  Patient participation: complete - patient participated  Level of consciousness: awake and alert  Pain score: 4    Airway patency: patent  Anesthetic complications: no  Cardiovascular status: hemodynamically stable  Respiratory status: acceptable  Hydration status: euvolemic    PONV: none          There were no known notable events for this encounter.     Nurse Pain Score: 4 (NPRS)

## 2025-07-28 NOTE — DISCHARGE INSTRUCTIONS
Dr. Roy's Discharge Instructions:  The first week after your joint replacement is a time to recover from the surgery. We expect light exercise to keep you active and mobile. Dr. Roy requires a walker or cane for most patients in the first few days following surgery to try to prevent falls or complications.     Most patients are prescribed two medications for pain control: a narcotic such as Oxycodone or Hydrocodone and a milder medication called Tramadol. These can be alternated for pain control, and the priority is to decrease use of the stronger narcotic as soon as tolerated.   Take your pain medication as appropriate to ensure that your pain is not limiting your recovery. You'll be seen in clinic in 7-10 days (this appointment has already been made, call our office if you're unsure of the time). At that time, we'll prescribe your physical therapy to help with the recovery phase.     -Keep dressing clean and dry. Leave dressing in place until follow up. If you have an incisional vac dressing, the battery may die before your first post operative appointment, but you can leave the surgical dressing in place and it will be removed at your clinic visit. The battery of the dressing may die, and the sponge will inflate because it is no longer holding suction. You can still leave the dressing in place and it will be removed at the office. Call the office if you notice drainage from the surgical dressing.    -OK to shower, keep dressing in place. Pat dressing dry, do not rub incision    -Do not soak incision in bath, hot tub or pool    -Follow up with Dr. Roy at regularly scheduled time    -Call [facility] office at [phone] for questions    -Weight bearing status: as tolerated with walker/cane; Patients who underwent a hip replacement should observe posterior hip precautions    -Take medication as prescribed for DVT prophylaxis  If any questions arise, call your provider.  If your provider is not available, please  feel free to call the Surgical Center at [phone].    MEDICATIONS: Resume taking daily medication.  Take prescribed pain medication with food.  If no medication is prescribed, you may take non-aspirin pain medication if needed.  PAIN MEDICATION CAN BE VERY CONSTIPATING.  Take a stool softener or laxative such as senokot, pericolace, or milk of magnesia if needed.    Last pain medication given at 5 mg oxycodone @ 0850    What to Expect Post Anesthesia    Rest and take it easy for the first 24 hours.  A responsible adult is recommended to remain with you during that time.  It is normal to feel sleepy.  We encourage you to not do anything that requires balance, judgment or coordination.    FOR 24 HOURS DO NOT:  Drive, operate machinery or run household appliances.  Drink beer or alcoholic beverages.  Make important decisions or sign legal documents.    To avoid nausea, slowly advance diet as tolerated, avoiding spicy or greasy foods for the first day.  Add more substantial food to your diet according to your provider's instructions.  Babies can be fed formula or breast milk as soon as they are hungry.  INCREASE FLUIDS AND FIBER TO AVOID CONSTIPATION.    MILD FLU-LIKE SYMPTOMS ARE NORMAL.  YOU MAY EXPERIENCE GENERALIZED MUSCLE ACHES, THROAT IRRITATION, HEADACHE AND/OR SOME NAUSEA.        Peripheral Nerve Block Discharge Instructions from Same Day Surgery and Inpatient :    What to Expect - Lower Extremity  The block may cause you to experience numbness and weakness in your hip and thigh, thigh and knee, or calf and foot on the same side as your surgery  Numbness, tingling and / or weakness are all normal. For some people, this may be an unpleasant sensation  These issues will be resolved when the local anesthetic wears off   You may experience numbness and tingling in your thigh on the same side as your surgery if the block medicine was injected at your groin area  Numbness will make it difficult to walk  You may  "have problems with balance and walking so be very careful   Follow your surgeon's direction regarding weight bearing on your surgical limb  Be very careful with your numb limb  Precautions  The numbness may affect your balance  Be careful when walking or moving around  Your leg may be weak: be very careful putting weight on it  If your surgeon did not specify a time, you should not bear weight for 24 hours  Be sure to ask for help when you need it  It is better to have help than to fall and hurt yourself  Prevent Injury  Protect the limb like a baby  Beware of exposing your limb to extreme heat or cold or trauma  The limb may be injured without you noticing because it is numb  Keep the limb elevated whenever possible  Do not sleep on the limb  Change the position of the limb regularly  Avoid putting pressure on your surgical limb  Pain Control  The initial block on the day of surgery will make your extremity feel \"numb\"  Any consecutive injection including prior to discharge from the hospital will make your extremity feel \"numb\"  You may feel an aching or burning when the local anesthesia starts to wear off  Take pain pills as prescribed by your surgeon  Call your surgeon or anesthesiologist if you do not have adequate pain control        "

## 2025-07-28 NOTE — ANESTHESIA PROCEDURE NOTES
Peripheral Block    Date/Time: 7/28/2025 6:40 AM    Performed by: Gwendolyn Caruso M.D.  Authorized by: Gwendolyn Caruso M.D.    Patient Location:  Pre-op  Start Time:  7/28/2025 6:40 AM  End Time:  7/28/2025 6:45 AM  Reason for Block: at surgeon's request and post-op pain management ONLY    patient identified, IV checked, site marked, risks and benefits discussed, surgical consent, monitors and equipment checked, pre-op evaluation and timeout performed    Patient Position:  Supine  Prep: ChloraPrep    Monitoring:  Heart rate, continuous pulse ox and cardiac monitor  Block Region:  Lower Extremity  Lower Extremity - Block Type:  Selective FEMORAL nerve block at the Adductor Canal    Laterality:  Right  Procedures: ultrasound guided  Image captured, interpreted and electronically stored.  Local Infiltration:  Lidocaine  Strength:  1 %  Dose:  3 ml  Block Type:  Single-shot  Needle Length:  100mm  Needle Gauge:  21 G  Needle Localization:  Ultrasound guidance  Ultrasound picture in chart  Injection Assessment:  Negative aspiration for heme, no paresthesia on injection, incremental injection and local visualized surrounding nerve on ultrasound  Evidence of intravascular injection: No

## 2025-07-28 NOTE — ANESTHESIA TIME REPORT
Anesthesia Start and Stop Event Times       Date Time Event    7/28/2025 0618 Ready for Procedure     0652 Anesthesia Start     0823 Anesthesia Stop          Responsible Staff  07/28/25      Name Role Begin End    Gwendolyn Caruso M.D. Anesth 0652 0823          Overtime Reason:  no overtime (within assigned shift)    Comments:

## 2025-07-28 NOTE — LETTER
July 22, 2025    Patient Name: Sandra Franco  Surgeon Name: Konrad Roy M.D.  Surgery Facility: [facility] ([address] [address] [facility])  Surgery Date: 7/28/2025    The time of your surgery is not final and may change up to and until the day of your surgery. You will be contacted 24-48 hours prior to your surgery date with your check-in and surgery time.    If you will not be at one of the below numbers please call the surgery scheduler at [phone]  Preferred Phone: [phone]    BEFORE YOUR SURGERY   Pre Registration and/or Lab Work must be done within and no earlier than 28 days prior to your surgery date. Your scheduled facility will contact you regarding all required preregistration and/or lab work. If you have not been contacted within 7 days of your scheduled procedure please call [facility] at [phone] for an appointment as soon as possible.    Instructions: Bring a list of all medications you are taking including the dosing and frequency.    - Please  your non-narcotic prescriptions prior to surgery at the pharmacy you provided us. DON'T START them until after your surgery.    DAY OF YOUR SURGERY    Nothing to eat or drink after midnight the night before surgery. This includes mints, gums, etc.     Refrain from smoking any substance or consuming any tobacco products after midnight prior to surgery. It may interfere with the anesthetic and frequently produces nausea during the recovery period.    Continue taking all lifesaving medications. Including the morning of your surgery with small sip of water.  If you have questions regarding what medication you can take on the day of surgery, please contact the preadmit department ([phone]).    Please do NOT take on the day of surgery:  Any diabetic medications  Diuretics: examples- Furosemide (Lasix), Spironolactone, Hydrochlorothiazide.   Ace Inhibitors: examples -  "Lisinopril, Ramipril, Enalapril  \"ARB's\": examples: Losartan, Olmesartan, Valsartan    Please arrive at the hospital/surgery center at the check-in time provided.   An adult will need to bring you and take you home after your surgery.         AFTER YOUR SURGERY  Post op Appointment:   Date: 8/7/2025   Time: 9:30AM   With: Konrad Roy MD   Location: [address] [place] [address], [place] [address]    - Therapy- Your first appointment should be 1  week(s) after your surgery. For your convenience we have 4 Physical Therapy locations: [place], [facility], [place], and [facility]. Call our office ASAP to schedule an appointment at [phone] or take the enclosed Therapy Prescription to a facility of your choice.  - No dental work for 3-6 months after your surgery.  - You must have someone provide transportation post surgery and someone to monitor you for at least 24 hours post-surgery. If you don't have either of these your appointment will be canceled.     TIME OFF WORK  FMLA or Disability forms can be faxed directly to: [phone] or you may drop them off at [address] [facility] [place] [address]. Our office charges a $35.00 fee per form. Forms will be completed within 10 business days of drop off and payment received. For the status of your forms you may contact our disability office directly [phone].    MEDICATION INSTRUCTIONS **Please read section completely**    Please consult your prescribing physician if you are on life saving blood thinners (Plavix, Coumadin, Eliquis, Xarelto, etc.) for when to stop prior to surgery    The following medications should be stopped a minimum of 14 days prior to surgery:    Anorectics: Phentermine (Adipex-P, Lomaira and Suprenza), Phentermine-topiramate (Qsymia),   Bupropion-naltrexone (Contrave)    **If you are on Bupropion for anxiety/depression, please continue this medication up until the day of surgery.     The following should be stopped a minimum of 7 days prior to " surgery:  All vitamins and supplements  Ozempic, Trulicity, Victoza    The following medications should be stopped 5 days prior to surgery:  Anti-Inflammatories: examples- Aspirin, Aspirin products, Ibuprofen/Advil, Aleve, Naproxen, Meloxicam, Celebrex, etc.    The following medications should be stopped 4 days prior to surgery:  Certain oral diabetic medications: ertugliflozin (Steglatro)    The following medications should be stopped a minimum of 3 days prior to surgery:  Certain oral diabetic medications: canagliflozin (Invokana), dapagliflozin (Farxiga), empagliflozin (Jardiance)  Opiod Partial Agonists/Opioid Antagonists: Buprenorphine (Suboxone, Belbuca, Butrans, Probuphine Implant, Sublocade), Naltrexone (ReVia, Vivitrol), Naloxone  PDE-5 inhibitors: Sildenafil (Viagra), Tadalafil (Cialis), Vardenafil (Levitra), Avanafil (Stendra)  MAO Inhibitors: Rasagiline (Azilect), Selegiline (Eldepryl, Emsam, Selapar), Isocarboxazid (Marplan), Phenelzine (Nardil)         Thank you,     [facility]    Contact Us:  [facility]   [phone]  Web: [email or web address]

## 2025-07-28 NOTE — INTERVAL H&P NOTE
Consented Procedure: ROBOTIC RIGHT TOTAL KNEE ARTHROPLASTY  I have examined the patient, provided the risks, benefits, and alternatives to the procedure(s) indicated on the signed consent form, and the patient wishes to proceed.    H&P reviewed. The patient was examined and there are no changes to the H&P      The risks of surgery were discussed with the patient.     These include pain, bleeding, infection, fractures and dislocations as well as damage to surrounding structures or neurovascular compromise.  The risks include the possibility of need for further surgery, lack of healing, lack of symptom relief.  The elevated risk of DVT following a joint replacement was discussed with the patient, and a plan was made for postoperative DVT prophylaxis.  We did discuss the possibility of leg length discrepancy both apparent and actual following joint replacement.      They do express an understanding that these are man-made parts, with limited lifespans, and that we would not be turning them into a bionic human being. We did discuss the various approaches to a joint replacement, and made no guarantees about incision size or postoperative discharge plans. Certainly there could be anesthetic complications such as heart attack, stroke, respiratory failure, or even death.      In addition to these significant complications, we have also informed the patient that there is some risk of dissatisfaction with their joint replacement.  I have told them that a joint replacement changes your life, usually for the better, but that certain patients do not like how their joint feels or moves after surgery.     All of the patient's questions were answered, they were shown models of the implants and images of their x-rays. she expressed understanding and wished to proceed.     We have told the patient a front wheeled walker will be required in the perioperative period. We did discuss the importance of physical therapy and the risk of  permanent stiffness or muscle weakness after surgery.         Konrad Roy M.D.  07/28/25 6:40 AM

## 2025-07-28 NOTE — OP REPORT
Date of Surgery:  07/28/25    Pre-operative Diagnosis:  right knee arthritis with lateral tibial plateau depression fracture    Post-operative Diagnosis:  right knee arthritis with lateral tibial plateau depression fracture    Procedure:  right knee replacement using robotic haptic arm assistance (Justin)    Implants used:  A Priscilla Triathlon CR total knee arthroplasty system with the following components  Femur: 3 Right CR  Tibia: 4 Universal with 12 x 50 Stem Extension  Polyethylene: 10 mm CS  Patella: A32 Poly  Fixation: 2 packages Simplex HV    Surgeon:  Konrad Roy MD    1st Assistant:   Alvin Flores PA-C    Anesthesiologist:   Gwendolyn Caruso M.D.     EBL:  250 cc    Specimen:  None    Findings:  Tricompartmental arthritic changes; lateral tibial plateau fracture, with incarcerated meniscus    Indications for procedure:  This patient is a 65 y.o. female with a long standing history of right knee arthritis. She had a fall onto a flexed knee resulting in a lateral tibial plateau depression fracture.  She was seen by our trauma department, referred for a total knee replacement given the level of degenerative changes.  The patient had failed conservative therapy including anti-inflammatory medications, activity modifications, injections, physical therapy and assistive devices. she was indicated for a total knee replacement. The patient expressed an understanding of the risks of pain, bleeding, infection, dislocation, malalignment, need for further surgery, damage to surrounding structures, neurovascular compromise, blood clots, leg-length discrepancy both apparent and actual, anesthetic complications, and serious medical consequences including but not limited to heart attack, stroke or even death. It was explained that the implants are man-made products and thus subject to possible failure.  The patient expressed an understanding and wished to proceed. Specific risks based on the patient's medical history were  addressed. A clearance was obtained by the medical physicians and the patient was taken to the operating room on the day of surgery for the above named procedure. The patient was felt to be an excellent candidate for our robotic assisted protocols, and the appropriate imaging and pre-operative plans were obtained and verified prior to surgery  We did discuss nonoperative treatments as well, including ORIF of her tibia, with plans for conversion to a total knee replacement at a later date.  She was informed that her recovery may be longer than a typical total knee given the additional injury, and her period of immobilization    Description of procedure:  The patient was met in the pre-operative holding area. The right knee was marked as the appropriate surgical site with indelible ink. They were taken to the operating room where the anesthesia department started a adductor/general for intraoperative and post-operative anesthesia and pain control. The patient was placed on the OR table and a tourniquet was placed high on the operative thigh. All bony prominences were padded appropriately. The operative knee was prepped and draped in a standard sterile surgical fashion. A time out procedure was called to verify the side and site of surgery, the proposed surgical procedure, and the administration of pre-operative antibiotics. After successful completion of the time out procedure, our attention was turned to the operative knee.  The tourniquet was inflated after exsanguination with an Esmarch bandage. The knee was flexed and a straight incision was made just medial to the midline. The capsule of the knee was cleared and a mid-vastus arthrotomy was performed. The patella was subluxated laterally and a medial release was performed to properly visualize the posteromedial aspect of the tibial plateau. The knee was extended, the patellar tendon was protected and the infrapatellar fat pad was excised. A lateral release was  performed. The patella was turned on its side and held in place with two penetrating towel clips. A free-hand patellar cut was made, the patella was sized and the appropriate lug holes were drilled. The patella was subluxed, the knee was flexed. The tourniquet was released. Hemostasis was obtained. Each hemijoint was cleared of soft tissue. The ACL was removed. At this point we placed the trackers and satellite arrays for the robotic-assisted arm. The hip center and the ankle center were verified. Multiple checkpoints from the patient's imaging were correlated intraoperatively. We did balance the ligaments in extension and flexion. Patella tracking was verified on the CT scan prior to bony cuts.  We did plan our tibial cut to be below her area of lateral plateau depression.  Once we had our plan, the haptic arm was brought in. The saw blade and femoral and tibial checkpoints were verified. We made our cuts taking care to protect the soft tissue structures. The bony pieces were removed.   A large Baker's cyst was evacuated, the laminar spreaders were placed and posterior osteophytes were removed. PCL was intact.   A trial knee was constructed and with the trial polyethylene in place we noted full extension without recurvatum and greater than 125 degrees of flexion with appropriate patellar tracking. At this point we removed the trial components and thoroughly irrigated the wound. Osteophytes were removed. The tourniquet was reinflated.  The real components were opened in a sterile fashion on the back table and then implanted into place sequentially, first the tibia, then the femur, then the patella.  The knee was again taken through a range of motion. Again we noted full extension and greater than 125 degrees of flexion with appropriate varus/valgus stability and patellar tracking. Therefore the real polyethylene was opened and inserted into the tibial tray. An audible click was heard as it engaged the tibia. Now a  dilute betadine solution  was instilled into the knee for 3 minutes before being pulse-lavaged out. The tourniquet was again released and hemostasis was obtained. The knee was flexed, the arthrotomy was closed with #1 Quill, followed by 2-0 Vicryl in the deep dermal layer in a buried interrupted fashion. The skin was closed with 3-0 monocryl in a running subcuticular fashion, and a sterile Silver Impregnated dressing was applied. The patient is currently in the operating room awaiting reversal of anesthesia. All sponge, checkpoint and instrument counts were correct at the end of the case.  A front wheel walker will be provided and will be required in the perioperative period to assist with balance, weakness, and pain during ambulation, and to help prevent falls.

## 2025-07-28 NOTE — ANESTHESIA PREPROCEDURE EVALUATION
Case: [id number] Date/Time: 07/28/25 0645    Procedure: ROBOTIC RIGHT TOTAL KNEE ARTHROPLASTY    Diagnosis: Primary osteoarthritis of right knee [M17.11]    Pre-op diagnosis: Primary osteoarthritis of right knee [M17.11]    Location:  OR  / SURGERY [facility]    Surgeons: Konrad Roy M.D.            Relevant Problems   CARDIAC   (positive) Agatston CAC score, >400         (positive) Liver cyst      Other   (positive) Degenerative arthritis of left knee   (positive) Primary osteoarthritis of right knee   (positive) Seasonal allergies       Physical Exam    Airway   Mallampati: II  TM distance: >3 FB  Neck ROM: full       Cardiovascular - normal exam  Rhythm: regular  Rate: normal    (-) murmur     Dental - normal exam           Pulmonary - normal examBreath sounds clear to auscultation     Abdominal    Neurological - normal exam                   Anesthesia Plan    ASA 2       Plan - general       Airway plan will be ETT          Induction: intravenous    Postoperative Plan: Postoperative administration of opioids is intended.    Pertinent diagnostic labs and testing reviewed    Informed Consent:    Anesthetic plan and risks discussed with patient.    Use of blood products discussed with: patient whom consented to blood products.

## 2025-07-29 NOTE — THERAPY
Physical Therapy   Initial Evaluation     Patient Name:  Sandra Franco  Age:  65 y.o., Sex:  female  Medical Record #:  [id number]  Today's Date: 7/28/2025     Precautions  Weight Bearing: Weight Bearing as Tolerated Right Lower Extremity    Assessment  Patient is 65 y.o. female s/p right TKA POD #0.  Pt agreeable to therapy evaluation. Pt is able to ambulate safely with FWW, noted to have mild R foot drop however pt able to self correct with steppage gait pattern R LE. Instructed pt to use visual cues to double check foot placement etc until block wears off. Stair training completed. Pt was provided with education on elevation, icing, positioning, and supine/seated therapeutic exercises. HEP handout issued, pt able to return demo all exercises.  Pt has support at home and has no further acute skilled PT needs at this time. Anticipate pt to d/c home once medically clear with recs for FWW use and OP therapy services.     Plan    Physical Therapy Initial Treatment Plan   Duration: Evaluation only    DC Equipment Recommendations: None  Discharge Recommendations: Recommend outpatient physical therapy services to address higher level deficits        07/28/25 1255   Prior Living Situation   Housing / Facility 1 Story House   Steps Into Home 3   Steps In Home 0   Equipment Owned Front-Wheel Walker;Crutches;Tub / Shower Seat;Raised Toilet Seat Without Arms   Lives with - Patient's Self Care Capacity Spouse   Comments Pt lives with supportive spouse   Prior Level of Functional Mobility   Bed Mobility Independent   Transfer Status Independent   Ambulation Independent   Assistive Devices Used None   Stairs Independent   Cognition    Level of Consciousness Alert   Comments Oriented x4   Strength Upper Body   Upper Body Strength  WDL   Passive ROM Lower Body   Passive ROM Lower Body X   Comments R knee 0-85 deg   Active ROM Lower Body    Active ROM Lower Body  X   Comments R knee 0-80 deg, unable to achieve neutral R DF  due to foot drop   Strength Lower Body   Lower Body Strength  X   Comments R knee limited by pain, R DF 2/5   Sensation Lower Body   Lower Extremity Sensation   WDL   Balance Assessment   Sitting Balance (Static) Good   Sitting Balance (Dynamic) Fair +   Standing Balance (Static) Fair +   Standing Balance (Dynamic) Fair   Weight Shift Sitting Good   Weight Shift Standing Fair   Comments stdg with FWW   Bed Mobility    Comments NT, pt sitting up in chair pre and post   Gait Analysis   Gait Level Of Assist Standby Assist   Assistive Device Front Wheel Walker   Distance (Feet) 75   # of Times Distance was Traveled 1   Deviation Decreased Heel Strike;Decreased Toe Off, steppage gait pattern R LE due to mild foot drop   # of Stairs Climbed 1   Level of Assist with Stairs Contact Guard Assist   Weight Bearing Status WBAT L LE   Functional Mobility   Sit to Stand Standby Assist   Bed, Chair, Wheelchair Transfer Standby Assist   Transfer Method Stand Step

## 2025-08-05 ENCOUNTER — OFFICE VISIT (OUTPATIENT)
Dept: URGENT CARE | Facility: CLINIC | Age: 65
End: 2025-08-05
Payer: MEDICARE

## 2025-08-05 ENCOUNTER — HOSPITAL ENCOUNTER (OUTPATIENT)
Facility: MEDICAL CENTER | Age: 65
End: 2025-08-05
Payer: MEDICARE

## 2025-08-05 VITALS
HEART RATE: 85 BPM | DIASTOLIC BLOOD PRESSURE: 66 MMHG | HEIGHT: 64 IN | TEMPERATURE: 98.2 F | RESPIRATION RATE: 14 BRPM | OXYGEN SATURATION: 98 % | BODY MASS INDEX: 22.2 KG/M2 | SYSTOLIC BLOOD PRESSURE: 118 MMHG | WEIGHT: 130 LBS

## 2025-08-05 DIAGNOSIS — N30.01 ACUTE CYSTITIS WITH HEMATURIA: ICD-10-CM

## 2025-08-05 DIAGNOSIS — N30.01 ACUTE CYSTITIS WITH HEMATURIA: Primary | ICD-10-CM

## 2025-08-05 LAB
APPEARANCE UR: NORMAL
BILIRUB UR STRIP-MCNC: NORMAL MG/DL
COLOR UR AUTO: YELLOW
GLUCOSE UR STRIP.AUTO-MCNC: NORMAL MG/DL
KETONES UR STRIP.AUTO-MCNC: NORMAL MG/DL
LEUKOCYTE ESTERASE UR QL STRIP.AUTO: NORMAL
NITRITE UR QL STRIP.AUTO: POSITIVE
PH UR STRIP.AUTO: 6 [PH] (ref 5–8)
PROT UR QL STRIP: NORMAL MG/DL
RBC UR QL AUTO: NORMAL
SP GR UR STRIP.AUTO: 1.02
UROBILINOGEN UR STRIP-MCNC: 1 MG/DL

## 2025-08-05 PROCEDURE — 81002 URINALYSIS NONAUTO W/O SCOPE: CPT

## 2025-08-05 PROCEDURE — 3078F DIAST BP <80 MM HG: CPT

## 2025-08-05 PROCEDURE — 3074F SYST BP LT 130 MM HG: CPT

## 2025-08-05 PROCEDURE — 87077 CULTURE AEROBIC IDENTIFY: CPT

## 2025-08-05 PROCEDURE — 87086 URINE CULTURE/COLONY COUNT: CPT

## 2025-08-05 PROCEDURE — 99214 OFFICE O/P EST MOD 30 MIN: CPT

## 2025-08-05 PROCEDURE — 87186 SC STD MICRODIL/AGAR DIL: CPT

## 2025-08-05 RX ORDER — CEFDINIR 300 MG/1
300 CAPSULE ORAL EVERY 12 HOURS
Qty: 10 CAPSULE | Refills: 0 | Status: SHIPPED | OUTPATIENT
Start: 2025-08-05 | End: 2025-08-10

## 2025-08-05 ASSESSMENT — ENCOUNTER SYMPTOMS
FEVER: 0
FLANK PAIN: 0
NAUSEA: 0
VOMITING: 0

## 2025-08-05 ASSESSMENT — FIBROSIS 4 INDEX: FIB4 SCORE: 0.72

## 2025-08-07 LAB
BACTERIA UR CULT: ABNORMAL
BACTERIA UR CULT: ABNORMAL
SIGNIFICANT IND 70042: ABNORMAL
SITE SITE: ABNORMAL
SOURCE SOURCE: ABNORMAL

## 2025-08-12 ENCOUNTER — APPOINTMENT (OUTPATIENT)
Dept: RADIOLOGY | Facility: MEDICAL CENTER | Age: 65
End: 2025-08-12
Attending: FAMILY MEDICINE
Payer: MEDICARE

## 2025-10-07 ENCOUNTER — APPOINTMENT (OUTPATIENT)
Dept: MEDICAL GROUP | Facility: MEDICAL CENTER | Age: 65
End: 2025-10-07
Payer: MEDICARE